# Patient Record
Sex: MALE | Race: BLACK OR AFRICAN AMERICAN | URBAN - METROPOLITAN AREA
[De-identification: names, ages, dates, MRNs, and addresses within clinical notes are randomized per-mention and may not be internally consistent; named-entity substitution may affect disease eponyms.]

---

## 2018-06-26 ENCOUNTER — OUTPATIENT (OUTPATIENT)
Dept: OUTPATIENT SERVICES | Facility: HOSPITAL | Age: 43
LOS: 1 days | Discharge: HOME | End: 2018-06-26

## 2018-06-26 DIAGNOSIS — E78.01 FAMILIAL HYPERCHOLESTEROLEMIA: ICD-10-CM

## 2018-06-26 DIAGNOSIS — E11.9 TYPE 2 DIABETES MELLITUS WITHOUT COMPLICATIONS: ICD-10-CM

## 2019-02-13 ENCOUNTER — OUTPATIENT (OUTPATIENT)
Dept: OUTPATIENT SERVICES | Facility: HOSPITAL | Age: 44
LOS: 1 days | Discharge: HOME | End: 2019-02-13

## 2019-02-13 DIAGNOSIS — E11.9 TYPE 2 DIABETES MELLITUS WITHOUT COMPLICATIONS: ICD-10-CM

## 2019-03-31 ENCOUNTER — INPATIENT (INPATIENT)
Facility: HOSPITAL | Age: 44
LOS: 2 days | Discharge: HOME | End: 2019-04-03
Attending: INTERNAL MEDICINE | Admitting: INTERNAL MEDICINE

## 2019-03-31 VITALS
OXYGEN SATURATION: 98 % | RESPIRATION RATE: 18 BRPM | TEMPERATURE: 97 F | DIASTOLIC BLOOD PRESSURE: 97 MMHG | HEART RATE: 68 BPM | SYSTOLIC BLOOD PRESSURE: 172 MMHG

## 2019-03-31 LAB
ALBUMIN SERPL ELPH-MCNC: 4 G/DL — SIGNIFICANT CHANGE UP (ref 3.5–5.2)
ALBUMIN SERPL ELPH-MCNC: 4.5 G/DL — SIGNIFICANT CHANGE UP (ref 3.5–5.2)
ALP SERPL-CCNC: 70 U/L — SIGNIFICANT CHANGE UP (ref 30–115)
ALP SERPL-CCNC: 82 U/L — SIGNIFICANT CHANGE UP (ref 30–115)
ALT FLD-CCNC: 28 U/L — SIGNIFICANT CHANGE UP (ref 0–41)
ALT FLD-CCNC: 31 U/L — SIGNIFICANT CHANGE UP (ref 0–41)
ANION GAP SERPL CALC-SCNC: 12 MMOL/L — SIGNIFICANT CHANGE UP (ref 7–14)
ANION GAP SERPL CALC-SCNC: 9 MMOL/L — SIGNIFICANT CHANGE UP (ref 7–14)
APTT BLD: 34.4 SEC — SIGNIFICANT CHANGE UP (ref 27–39.2)
AST SERPL-CCNC: 20 U/L — SIGNIFICANT CHANGE UP (ref 0–41)
AST SERPL-CCNC: 26 U/L — SIGNIFICANT CHANGE UP (ref 0–41)
BASOPHILS # BLD AUTO: 0.01 K/UL — SIGNIFICANT CHANGE UP (ref 0–0.2)
BASOPHILS # BLD AUTO: 0.02 K/UL — SIGNIFICANT CHANGE UP (ref 0–0.2)
BASOPHILS NFR BLD AUTO: 0.1 % — SIGNIFICANT CHANGE UP (ref 0–1)
BASOPHILS NFR BLD AUTO: 0.4 % — SIGNIFICANT CHANGE UP (ref 0–1)
BILIRUB SERPL-MCNC: 0.3 MG/DL — SIGNIFICANT CHANGE UP (ref 0.2–1.2)
BILIRUB SERPL-MCNC: 0.3 MG/DL — SIGNIFICANT CHANGE UP (ref 0.2–1.2)
BUN SERPL-MCNC: 13 MG/DL — SIGNIFICANT CHANGE UP (ref 10–20)
BUN SERPL-MCNC: 9 MG/DL — LOW (ref 10–20)
CALCIUM SERPL-MCNC: 9.2 MG/DL — SIGNIFICANT CHANGE UP (ref 8.5–10.1)
CALCIUM SERPL-MCNC: 9.5 MG/DL — SIGNIFICANT CHANGE UP (ref 8.5–10.1)
CHLORIDE SERPL-SCNC: 101 MMOL/L — SIGNIFICANT CHANGE UP (ref 98–110)
CHLORIDE SERPL-SCNC: 104 MMOL/L — SIGNIFICANT CHANGE UP (ref 98–110)
CO2 SERPL-SCNC: 24 MMOL/L — SIGNIFICANT CHANGE UP (ref 17–32)
CO2 SERPL-SCNC: 27 MMOL/L — SIGNIFICANT CHANGE UP (ref 17–32)
CREAT SERPL-MCNC: 0.9 MG/DL — SIGNIFICANT CHANGE UP (ref 0.7–1.5)
CREAT SERPL-MCNC: 1 MG/DL — SIGNIFICANT CHANGE UP (ref 0.7–1.5)
EOSINOPHIL # BLD AUTO: 0.11 K/UL — SIGNIFICANT CHANGE UP (ref 0–0.7)
EOSINOPHIL # BLD AUTO: 0.25 K/UL — SIGNIFICANT CHANGE UP (ref 0–0.7)
EOSINOPHIL NFR BLD AUTO: 2.1 % — SIGNIFICANT CHANGE UP (ref 0–8)
EOSINOPHIL NFR BLD AUTO: 3.5 % — SIGNIFICANT CHANGE UP (ref 0–8)
GLUCOSE BLDC GLUCOMTR-MCNC: 129 MG/DL — HIGH (ref 70–99)
GLUCOSE BLDC GLUCOMTR-MCNC: 82 MG/DL — SIGNIFICANT CHANGE UP (ref 70–99)
GLUCOSE SERPL-MCNC: 115 MG/DL — HIGH (ref 70–99)
GLUCOSE SERPL-MCNC: 220 MG/DL — HIGH (ref 70–99)
HCT VFR BLD CALC: 39.3 % — LOW (ref 42–52)
HCT VFR BLD CALC: 40.5 % — LOW (ref 42–52)
HGB BLD-MCNC: 12.9 G/DL — LOW (ref 14–18)
HGB BLD-MCNC: 13.3 G/DL — LOW (ref 14–18)
IMM GRANULOCYTES NFR BLD AUTO: 0.1 % — SIGNIFICANT CHANGE UP (ref 0.1–0.3)
IMM GRANULOCYTES NFR BLD AUTO: 0.2 % — SIGNIFICANT CHANGE UP (ref 0.1–0.3)
INR BLD: 1.01 RATIO — SIGNIFICANT CHANGE UP (ref 0.65–1.3)
LYMPHOCYTES # BLD AUTO: 2.39 K/UL — SIGNIFICANT CHANGE UP (ref 1.2–3.4)
LYMPHOCYTES # BLD AUTO: 3.46 K/UL — HIGH (ref 1.2–3.4)
LYMPHOCYTES # BLD AUTO: 45.2 % — SIGNIFICANT CHANGE UP (ref 20.5–51.1)
LYMPHOCYTES # BLD AUTO: 48.8 % — SIGNIFICANT CHANGE UP (ref 20.5–51.1)
MAGNESIUM SERPL-MCNC: 2.3 MG/DL — SIGNIFICANT CHANGE UP (ref 1.8–2.4)
MCHC RBC-ENTMCNC: 25.2 PG — LOW (ref 27–31)
MCHC RBC-ENTMCNC: 25.3 PG — LOW (ref 27–31)
MCHC RBC-ENTMCNC: 32.8 G/DL — SIGNIFICANT CHANGE UP (ref 32–37)
MCHC RBC-ENTMCNC: 32.8 G/DL — SIGNIFICANT CHANGE UP (ref 32–37)
MCV RBC AUTO: 76.8 FL — LOW (ref 80–94)
MCV RBC AUTO: 77.1 FL — LOW (ref 80–94)
MONOCYTES # BLD AUTO: 0.46 K/UL — SIGNIFICANT CHANGE UP (ref 0.1–0.6)
MONOCYTES # BLD AUTO: 0.52 K/UL — SIGNIFICANT CHANGE UP (ref 0.1–0.6)
MONOCYTES NFR BLD AUTO: 7.3 % — SIGNIFICANT CHANGE UP (ref 1.7–9.3)
MONOCYTES NFR BLD AUTO: 8.7 % — SIGNIFICANT CHANGE UP (ref 1.7–9.3)
NEUTROPHILS # BLD AUTO: 2.3 K/UL — SIGNIFICANT CHANGE UP (ref 1.4–6.5)
NEUTROPHILS # BLD AUTO: 2.84 K/UL — SIGNIFICANT CHANGE UP (ref 1.4–6.5)
NEUTROPHILS NFR BLD AUTO: 40.2 % — LOW (ref 42.2–75.2)
NEUTROPHILS NFR BLD AUTO: 43.4 % — SIGNIFICANT CHANGE UP (ref 42.2–75.2)
NRBC # BLD: 0 /100 WBCS — SIGNIFICANT CHANGE UP (ref 0–0)
NRBC # BLD: 0 /100 WBCS — SIGNIFICANT CHANGE UP (ref 0–0)
PLATELET # BLD AUTO: 191 K/UL — SIGNIFICANT CHANGE UP (ref 130–400)
PLATELET # BLD AUTO: 198 K/UL — SIGNIFICANT CHANGE UP (ref 130–400)
POTASSIUM SERPL-MCNC: 3.9 MMOL/L — SIGNIFICANT CHANGE UP (ref 3.5–5)
POTASSIUM SERPL-MCNC: 4.4 MMOL/L — SIGNIFICANT CHANGE UP (ref 3.5–5)
POTASSIUM SERPL-SCNC: 3.9 MMOL/L — SIGNIFICANT CHANGE UP (ref 3.5–5)
POTASSIUM SERPL-SCNC: 4.4 MMOL/L — SIGNIFICANT CHANGE UP (ref 3.5–5)
PROT SERPL-MCNC: 7.3 G/DL — SIGNIFICANT CHANGE UP (ref 6–8)
PROT SERPL-MCNC: 8 G/DL — SIGNIFICANT CHANGE UP (ref 6–8)
PROTHROM AB SERPL-ACNC: 11.6 SEC — SIGNIFICANT CHANGE UP (ref 9.95–12.87)
RBC # BLD: 5.12 M/UL — SIGNIFICANT CHANGE UP (ref 4.7–6.1)
RBC # BLD: 5.25 M/UL — SIGNIFICANT CHANGE UP (ref 4.7–6.1)
RBC # FLD: 13.4 % — SIGNIFICANT CHANGE UP (ref 11.5–14.5)
RBC # FLD: 13.5 % — SIGNIFICANT CHANGE UP (ref 11.5–14.5)
SODIUM SERPL-SCNC: 137 MMOL/L — SIGNIFICANT CHANGE UP (ref 135–146)
SODIUM SERPL-SCNC: 140 MMOL/L — SIGNIFICANT CHANGE UP (ref 135–146)
TROPONIN T SERPL-MCNC: <0.01 NG/ML — SIGNIFICANT CHANGE UP
TROPONIN T SERPL-MCNC: <0.01 NG/ML — SIGNIFICANT CHANGE UP
WBC # BLD: 5.29 K/UL — SIGNIFICANT CHANGE UP (ref 4.8–10.8)
WBC # BLD: 7.09 K/UL — SIGNIFICANT CHANGE UP (ref 4.8–10.8)
WBC # FLD AUTO: 5.29 K/UL — SIGNIFICANT CHANGE UP (ref 4.8–10.8)
WBC # FLD AUTO: 7.09 K/UL — SIGNIFICANT CHANGE UP (ref 4.8–10.8)

## 2019-03-31 RX ORDER — INSULIN GLARGINE 100 [IU]/ML
9 INJECTION, SOLUTION SUBCUTANEOUS AT BEDTIME
Qty: 0 | Refills: 0 | Status: DISCONTINUED | OUTPATIENT
Start: 2019-03-31 | End: 2019-04-03

## 2019-03-31 RX ORDER — ATORVASTATIN CALCIUM 80 MG/1
40 TABLET, FILM COATED ORAL AT BEDTIME
Qty: 0 | Refills: 0 | Status: DISCONTINUED | OUTPATIENT
Start: 2019-03-31 | End: 2019-04-01

## 2019-03-31 RX ORDER — SODIUM CHLORIDE 9 MG/ML
1000 INJECTION INTRAMUSCULAR; INTRAVENOUS; SUBCUTANEOUS
Qty: 0 | Refills: 0 | Status: DISCONTINUED | OUTPATIENT
Start: 2019-03-31 | End: 2019-03-31

## 2019-03-31 RX ORDER — HEPARIN SODIUM 5000 [USP'U]/ML
5000 INJECTION INTRAVENOUS; SUBCUTANEOUS EVERY 8 HOURS
Qty: 0 | Refills: 0 | Status: DISCONTINUED | OUTPATIENT
Start: 2019-03-31 | End: 2019-04-03

## 2019-03-31 RX ORDER — SODIUM CHLORIDE 9 MG/ML
1000 INJECTION, SOLUTION INTRAVENOUS
Qty: 0 | Refills: 0 | Status: DISCONTINUED | OUTPATIENT
Start: 2019-03-31 | End: 2019-04-03

## 2019-03-31 RX ORDER — ASPIRIN/CALCIUM CARB/MAGNESIUM 324 MG
325 TABLET ORAL ONCE
Qty: 0 | Refills: 0 | Status: COMPLETED | OUTPATIENT
Start: 2019-03-31 | End: 2019-03-31

## 2019-03-31 RX ORDER — SODIUM CHLORIDE 9 MG/ML
1000 INJECTION INTRAMUSCULAR; INTRAVENOUS; SUBCUTANEOUS
Qty: 0 | Refills: 0 | Status: DISCONTINUED | OUTPATIENT
Start: 2019-03-31 | End: 2019-04-01

## 2019-03-31 RX ORDER — AMLODIPINE BESYLATE 2.5 MG/1
10 TABLET ORAL DAILY
Qty: 0 | Refills: 0 | Status: DISCONTINUED | OUTPATIENT
Start: 2019-03-31 | End: 2019-04-01

## 2019-03-31 RX ORDER — ATORVASTATIN CALCIUM 80 MG/1
80 TABLET, FILM COATED ORAL ONCE
Qty: 0 | Refills: 0 | Status: COMPLETED | OUTPATIENT
Start: 2019-03-31 | End: 2019-03-31

## 2019-03-31 RX ORDER — DEXTROSE 50 % IN WATER 50 %
15 SYRINGE (ML) INTRAVENOUS ONCE
Qty: 0 | Refills: 0 | Status: DISCONTINUED | OUTPATIENT
Start: 2019-03-31 | End: 2019-04-03

## 2019-03-31 RX ORDER — DEXTROSE 50 % IN WATER 50 %
12.5 SYRINGE (ML) INTRAVENOUS ONCE
Qty: 0 | Refills: 0 | Status: DISCONTINUED | OUTPATIENT
Start: 2019-03-31 | End: 2019-04-03

## 2019-03-31 RX ORDER — GLUCAGON INJECTION, SOLUTION 0.5 MG/.1ML
1 INJECTION, SOLUTION SUBCUTANEOUS ONCE
Qty: 0 | Refills: 0 | Status: DISCONTINUED | OUTPATIENT
Start: 2019-03-31 | End: 2019-04-03

## 2019-03-31 RX ORDER — LISINOPRIL 2.5 MG/1
40 TABLET ORAL DAILY
Qty: 0 | Refills: 0 | Status: DISCONTINUED | OUTPATIENT
Start: 2019-03-31 | End: 2019-04-01

## 2019-03-31 RX ORDER — ASPIRIN/CALCIUM CARB/MAGNESIUM 324 MG
300 TABLET ORAL DAILY
Qty: 0 | Refills: 0 | Status: DISCONTINUED | OUTPATIENT
Start: 2019-03-31 | End: 2019-04-01

## 2019-03-31 RX ORDER — INSULIN LISPRO 100/ML
3 VIAL (ML) SUBCUTANEOUS
Qty: 0 | Refills: 0 | Status: DISCONTINUED | OUTPATIENT
Start: 2019-03-31 | End: 2019-04-03

## 2019-03-31 RX ORDER — INSULIN LISPRO 100/ML
VIAL (ML) SUBCUTANEOUS
Qty: 0 | Refills: 0 | Status: DISCONTINUED | OUTPATIENT
Start: 2019-03-31 | End: 2019-04-03

## 2019-03-31 RX ORDER — METOPROLOL TARTRATE 50 MG
50 TABLET ORAL
Qty: 0 | Refills: 0 | Status: DISCONTINUED | OUTPATIENT
Start: 2019-03-31 | End: 2019-04-03

## 2019-03-31 RX ADMIN — Medication 50 MILLIGRAM(S): at 18:37

## 2019-03-31 RX ADMIN — ATORVASTATIN CALCIUM 40 MILLIGRAM(S): 80 TABLET, FILM COATED ORAL at 21:16

## 2019-03-31 RX ADMIN — AMLODIPINE BESYLATE 10 MILLIGRAM(S): 2.5 TABLET ORAL at 21:16

## 2019-03-31 RX ADMIN — SODIUM CHLORIDE 75 MILLILITER(S): 9 INJECTION INTRAMUSCULAR; INTRAVENOUS; SUBCUTANEOUS at 13:44

## 2019-03-31 RX ADMIN — LISINOPRIL 40 MILLIGRAM(S): 2.5 TABLET ORAL at 21:16

## 2019-03-31 RX ADMIN — ATORVASTATIN CALCIUM 80 MILLIGRAM(S): 80 TABLET, FILM COATED ORAL at 11:00

## 2019-03-31 RX ADMIN — HEPARIN SODIUM 5000 UNIT(S): 5000 INJECTION INTRAVENOUS; SUBCUTANEOUS at 21:16

## 2019-03-31 RX ADMIN — Medication 325 MILLIGRAM(S): at 08:53

## 2019-03-31 RX ADMIN — INSULIN GLARGINE 9 UNIT(S): 100 INJECTION, SOLUTION SUBCUTANEOUS at 21:17

## 2019-03-31 RX ADMIN — SODIUM CHLORIDE 75 MILLILITER(S): 9 INJECTION INTRAMUSCULAR; INTRAVENOUS; SUBCUTANEOUS at 18:38

## 2019-03-31 NOTE — ED ADULT TRIAGE NOTE - CHIEF COMPLAINT QUOTE
Tingling to right hand since waking up at 5am, right sided weakness in leg, elevated blood pressure this morning. FS in triage 204.

## 2019-03-31 NOTE — ED PROVIDER NOTE - ATTENDING CONTRIBUTION TO CARE
43y m h/o htn, dm p/w R sided numbness since 5am. Woke up at 4am in usual soh, dvlpd sudden onset rue/rle numbness at 5am. Accomp by subj rle weakness. Denies ha, blurry vision, dizziness, cp/sob, nv, abd pain. Cardio [place] - no PMD. PE: nad, ncat, perrl/eomi, neck supple no bruits, rrr nl s1s2 no mrg, ctab no wrr, abd soft ntnd no palpable masses no rgr, no cvat, ext no cce dpi, neuro- aaox3, cn 2-12 intact bl no nystagmus or facial droop, 5/5 strength x 4 no drift, gross sensation intact, +rue dysmetria.

## 2019-03-31 NOTE — ED ADULT NURSE NOTE - NSFALLRSKHRMRISKTYPE_ED_ALL_ED
Date: 3/5/2019    Patient Name: Cornelius Bowman          To Whom it may concern: This letter has been written at the patient's request. The above patient was seen at the [facility] for treatment of a medical condition.     This patient paulie other

## 2019-03-31 NOTE — ED PROVIDER NOTE - OBJECTIVE STATEMENT
42yo M hx HTN DM otherwise healthy pw RUE and RLE numbness since 5am- woke up at 4am feeling normal, then felt RUE/RLE numbness developing- pt feels mildly improved now but still co sx- no headache vision changes f/c/n/v/d, chest pain, shortness of breath, abdominal pain, other numbness/focal weakness, back pain, dysuria/hematuria, hx CVA/TIA

## 2019-03-31 NOTE — H&P ADULT - NSHPPHYSICALEXAM_GEN_ALL_CORE
GEN: No acute distress  LUNGS: breath sounds bilaterally   HEART: S1/S2 present. RRR.   ABD: Soft, non-tender, non-distended.  EXT: No edema, cyanosis, moves all extremities   NEURO: AAOX3, non focal. NIHSS is 0

## 2019-03-31 NOTE — H&P ADULT - ATTENDING COMMENTS
Patient is seen and examined independently at bedside. I agree with the resident's note, history and plan as above. Pt states that he is compliant with medications and did not miss his blood pressure medications.    PHYSICAL EXAM:  CONSTITUTIONAL: NAD, well-groomed, well-developed.  HEAD:  Atraumatic, Normocephalic.  EYES: EOMI, PERRLA, conjunctiva and sclera clear.  ENMT: Moist mucous membranes, No lesions.  NERVOUS SYSTEM:  Alert & Oriented X3, Good concentration; No limb weakness or numbness. Right palm paresthesia. Finger to nose test abnormal on right side. Dysmetria present over right hand and right pelvic and leg.  RESPIRATORY: Clear to ascultation bilaterally; No rales, rhonchi, wheezing, or rubs.  CARDIOVASCULAR: Regular rate and rhythm; No murmurs, rubs, or gallops.  GASTROINTESTINAL: Soft, Nontender, Nondistended;  MUSCULOSKELETAL: No joint swelling or tenderness. No neck or back pain.  EXTREMITIES: No clubbing, cyanosis, or edema.  LYMPH: No cervical lymphadenopathy noted.  SKIN: No rashes or lesions.    # Dysmetria of right upper and lower extremity  - likely cerebellar origin, cognitive in tact  - rule out CVA vs. mass vs. demyelinating/motor neuron disease  - c/w aspirin and Lipitor for now  - check [id number] and lipid profile  - follow up neurology  - order for 2D Echo with bubble study and MRI Head NC  - c/w IV hydration as per neurology    # DM II - monitor fingerstick glucose, c/w insulin regimen, hold po diabetic medications  # HTN, stable - c/w home medications    All labs, radiologic studies, vitals, orders and medications list reviewed. Patient is seen and examined independently at bedside. I agree with the resident's note, history and plan as above. Pt states that he is compliant with medications and did not miss his blood pressure medications.    PHYSICAL EXAM:  CONSTITUTIONAL: NAD, well-groomed, well-developed.  HEAD:  Atraumatic, Normocephalic.  EYES: EOMI, PERRLA, conjunctiva and sclera clear.  ENMT: Moist mucous membranes, No lesions.  NERVOUS SYSTEM:  Alert & Oriented X3, Good concentration; No limb weakness or numbness. Right palm paresthesia. Finger to nose test abnormal on right side. Dysmetria present over right hand and right pelvic and leg.  RESPIRATORY: Clear to ascultation bilaterally; No rales, rhonchi, wheezing, or rubs.  CARDIOVASCULAR: Regular rate and rhythm; No murmurs, rubs, or gallops.  GASTROINTESTINAL: Soft, Nontender, Nondistended;  MUSCULOSKELETAL: No joint swelling or tenderness. No neck or back pain.  EXTREMITIES: No clubbing, cyanosis, or edema.  LYMPH: No cervical lymphadenopathy noted.  SKIN: No rashes or lesions.    # Dysmetria of right upper and lower extremity  - likely cerebellar origin, cognitive in tact  - rule out CVA vs. mass vs. demyelinating/motor neuron disease  - c/w aspirin and Lipitor for now  - check [id number] and lipid profile  - follow up neurology  - order for 2D Echo with bubble study and MRI Head NC  - c/w IV hydration as per neurology    # Mitral regurgitation with torn chord associated with left atrium enlargement  - Cardiology evaluation, may need ZAY to assess for thrombus    # DM II - monitor fingerstick glucose, c/w insulin regimen, hold po diabetic medications  # HTN, stable - c/w home medications    All labs, radiologic studies, vitals, orders and medications list reviewed. Patient is seen and examined independently at bedside. I agree with the resident's note, history and plan as above. Pt states that he is compliant with medications and did not miss his blood pressure medications.    PHYSICAL EXAM:  CONSTITUTIONAL: NAD, well-groomed, well-developed.  HEAD:  Atraumatic, Normocephalic.  EYES: EOMI, PERRLA, conjunctiva and sclera clear. No nystagmus.   ENMT: Moist mucous membranes, No lesions.  NERVOUS SYSTEM:  Alert & Oriented X3, Good concentration; No limb weakness or numbness. Right palm paresthesia. Finger to nose test abnormal on right side. Dysmetria present over right hand and right pelvic and leg.  RESPIRATORY: Clear to ascultation bilaterally; No rales, rhonchi, wheezing, or rubs.  CARDIOVASCULAR: Regular rate and rhythm; No murmurs, rubs, or gallops.  GASTROINTESTINAL: Soft, Nontender, Nondistended;  MUSCULOSKELETAL: No joint swelling or tenderness. No neck or back pain.  EXTREMITIES: No clubbing, cyanosis, or edema.  LYMPH: No cervical lymphadenopathy noted.  SKIN: No rashes or lesions.    # Dysmetria of right upper and lower extremity  - likely cerebellar origin, cognitive intact  - rule out CVA vs. mass vs. demyelinating/motor neuron disease  - c/w aspirin and Lipitor for now  - check [id number] and lipid profile  - follow up neurology  - order for 2D Echo with bubble study and MRI Head NC  - c/w IV hydration as per neurology    # Mitral regurgitation with torn chord associated with left atrium enlargement  - Cardiology evaluation, may need ZAY to assess for thrombus rule out underlying arrhythmia, waiting neuro input pending MRI result    # DM II - monitor fingerstick glucose, c/w insulin regimen, hold po diabetic medications  # HTN, stable - c/w home medications    All labs, radiologic studies, vitals, orders and medications list reviewed.

## 2019-03-31 NOTE — ED PROVIDER NOTE - PROGRESS NOTE DETAILS
pt feeling improved- neuro Katerina at bedside, NIH0, per her and discussions w Dr. Gar, recs TIA obs pt still feeling improved, NIH 0, will obs sp eval by Dr. Gar- +R sided dysmetria, recs CTA H/N to r/o dissection, admit to stroke unit sp eval by Dr. Gar- +R sided dysmetria, recs CTA H/N to r/o dissection, asa/statin, admit to stroke unit

## 2019-03-31 NOTE — ED PROVIDER NOTE - CLINICAL SUMMARY MEDICAL DECISION MAKING FREE TEXT BOX
stroke-like sx 5am, abnormal neuro exam - stroke code activated by triage on arrival - non-con CTH nl, pt eval by Neuro team incl Dr. Noah Coburn who rec CTA head/neck to r/o dissection, asa/statin & stroke unit admission

## 2019-03-31 NOTE — CONSULT NOTE ADULT - ASSESSMENT
40 yo male with pmh as stated above presents with acute onset of right sided paresthesia and dysmetria. NIHSS is 1. Patient is outside of tPA window. CTH is negative.    Plan:  CTA head and neck  [id number], Statin 80  Keep systolic 140-180  Lipid profile, HA1C  Admit to stroke unit  Frequent neurochecks  MRI brain NC  Echo with bubble study  Speech/swallow  PT/OT  IVF    Neuroattending note will follow 42 yo male with pmh as stated above presents with acute onset of right sided paresthesia and dysmetria. NIHSS is 1. Patient is outside of tPA window. CTH is negative.    Plan:  CTA head and neck  [id number] x 1 then 81mg QD, Statin 80  Keep systolic 140-180  Lipid profile, HA1C  Hypercoagulable workup  Admit to stroke unit  Frequent neurochecks  MRI brain NC  Echo with bubble study  Speech/swallow  PT/OT  IVF NS at 75cc/hr

## 2019-03-31 NOTE — H&P ADULT - NSICDXFAMILYHX_GEN_ALL_CORE_FT
FAMILY HISTORY:  No pertinent family history in first degree relatives FAMILY HISTORY:  Family history of kidney stones, Father

## 2019-03-31 NOTE — ED ADULT NURSE NOTE - NSIMPLEMENTINTERV_GEN_ALL_ED
Implemented All Fall with Harm Risk Interventions:  [place] to call system. Call bell, personal items and telephone within reach. Instruct patient to call for assistance. Room bathroom lighting operational. Non-slip footwear when patient is off stretcher. Physically safe environment: no spills, clutter or unnecessary equipment. Stretcher in lowest position, wheels locked, appropriate side rails in place. Provide visual cue, wrist band, yellow gown, etc. Monitor gait and stability. Monitor for mental status changes and reorient to person, place, and time. Review medications for side effects contributing to fall risk. Reinforce activity limits and safety measures with patient and family. Provide visual clues: red socks.

## 2019-03-31 NOTE — CONSULT NOTE ADULT - ATTENDING COMMENTS
Patient seen and examined with PA shortly after stroke code.   Patient woke up this AM at 4AM then fell asleep on right side on the hardwood floor.  Woke up 5-5:30 with right side numb.  Tried exercising and wasn't moving right leg well.  Initial NIHSS 0 in ED  However reassessment by me showed NIHSS of 2 for dysmetria in right arm and leg    Requested a STAT CTA H+N to look for possible dissection     Plan as above

## 2019-03-31 NOTE — ED PROVIDER NOTE - PHYSICAL EXAMINATION
Con: Well appearing NAD non toxic.   HEENT: NCAT PERRLA EOMI conjunctiva normal. No nasal discharge. MMM.   Neck: Supple, non tender, full ROM.   CV: RRR no MRG +S1S2.   Pulm: CTA b/l.   Abd: s NT ND +BS.   Ext: WWP x4, moving all extremities, no edema.   Psy: Cooperative, appropriate.   Skin: Warm, dry, no rash  Neuro: CN2-12 grossly intact no sensory or motor deficits throughout, no drift, rapid alternating wnl, no ataxia, neg romberg.

## 2019-03-31 NOTE — H&P ADULT - HISTORY OF PRESENT ILLNESS
41 year old male with pertinent medical history of Hypertension and Diabetes Mellitus presents to the ED with acute onset of weakness. He was in normal state of health watching TV when around 430 am in the morning he had symptoms of right hand weakness. It was associated with numbness and tingling in the same hand. He decided to get up and found his right leg felt slightly weak and unstable. He started exercising and did some squats to find his right side more unsteady than the right. He took his Blood pressure and found out that it was high (above 170/125) and his fingerstick was high in 300s. He denies any headache, dizziness, Nausea or Vomiting and any visual changes.  In ED stroke code was called, NIHSS is 1, patient has right sided dysmetria.

## 2019-03-31 NOTE — H&P ADULT - NSHPLABSRESULTS_GEN_ALL_CORE
VITALS:   T(F): 98  HR: 58  BP: 143/87  RR: 16  SpO2: 100%    LABS:                        13.3   5.29  )-----------( 198      ( 31 Mar 2019 07:26 )             40.5     03-31    137  |  101  |  9<L>  ----------------------------<  220<H>  4.4   |  27  |  0.9    Ca    9.5      31 Mar 2019 07:26  Mg     2.3     03-31    TPro  8.0  /  Alb  4.5  /  TBili  0.3  /  DBili  x   /  AST  26  /  ALT  31  /  AlkPhos  82  03-31    PT/INR - ( 31 Mar 2019 07:26 )   PT: 11.60 sec;   INR: 1.01 ratio         PTT - ( 31 Mar 2019 07:26 )  PTT:34.4 sec      Troponin T, Serum: <0.01 ng/mL (03-31-19 @ 07:26)      CARDIAC MARKERS ( 31 Mar 2019 07:26 )  x     / <0.01 ng/mL / x     / x     / x          RADIOLOGY:    CT Angio Head and Neck w/ IV Cont (03.31.19 @ 09:50) >  Unremarkable CT angiogram of the head and neck. Incidentally noted retropharyngeal course of the right internal carotid   artery, normal variant.     CT Head  No CT evidence for acute intracranial pathology.   Periventricular white matter chronic microvascular changes.

## 2019-03-31 NOTE — H&P ADULT - ASSESSMENT
41 year old male being evaluated for following medical problems    Rule out Transient Ischemic Stroke  -Continue telemetry monitoring  -Currently NIHSS is 0  -Negative CTA head and neck and CT head shows chronic microvascular changes  -Follow up on MRI of the brain without contrast  -Keep SBP between 120-160  -Keep HOB at 30  -Keep electrolytes in check   -Neuro-checks every 4 hours  -On aspirin and atorvastatin for now  -Follow up Lipid profile and HbA1c  -Speech and swallow evaluation  -PT and OT requested  -Physiatry requested  -On IV fluids  -Follow up Neurology recommendation    Hypertension  -Continue lisinopril and amlodipine  -Will have on Metoprolol (no Bystolic in the hospital)    Diabetes Mellitus  -Will have on Insulin in the hospital    Full Code  Comes from home  On DVT prophylaxis with heparin 41 year old male being evaluated for following medical problems    Rule out Transient Ischemic Stroke  -Continue telemetry monitoring  -Currently NIHSS is 0  -Negative CTA head and neck and CT head shows chronic microvascular changes  -Follow up on MRI of the brain without contrast  -Keep SBP between 120-160  -Keep HOB at 30  -Keep electrolytes in check   -Neuro-checks every 4 hours  -On aspirin and atorvastatin for now  -Follow up Lipid profile and HbA1c  -Speech and swallow evaluation not needed, passed dysphagia screen with no issues   -PT and OT requested  -Physiatry requested  -On IV fluids  -Follow up Neurology recommendation    Hypertension  -Continue lisinopril and amlodipine  -Will have on Metoprolol (no Bystolic in the hospital)    Diabetes Mellitus  -Will have on Insulin in the hospital    Full Code  Comes from home  On DVT prophylaxis with heparin

## 2019-04-01 LAB
ALBUMIN SERPL ELPH-MCNC: 4 G/DL — SIGNIFICANT CHANGE UP (ref 3.5–5.2)
ALP SERPL-CCNC: 70 U/L — SIGNIFICANT CHANGE UP (ref 30–115)
ALT FLD-CCNC: 29 U/L — SIGNIFICANT CHANGE UP (ref 0–41)
ANION GAP SERPL CALC-SCNC: 10 MMOL/L — SIGNIFICANT CHANGE UP (ref 7–14)
APTT BLD: 32.9 SEC — SIGNIFICANT CHANGE UP (ref 27–39.2)
AST SERPL-CCNC: 23 U/L — SIGNIFICANT CHANGE UP (ref 0–41)
BILIRUB DIRECT SERPL-MCNC: <0.2 MG/DL — SIGNIFICANT CHANGE UP (ref 0–0.2)
BILIRUB INDIRECT FLD-MCNC: >0.2 MG/DL — SIGNIFICANT CHANGE UP (ref 0.2–1.2)
BILIRUB SERPL-MCNC: 0.4 MG/DL — SIGNIFICANT CHANGE UP (ref 0.2–1.2)
BUN SERPL-MCNC: 12 MG/DL — SIGNIFICANT CHANGE UP (ref 10–20)
CALCIUM SERPL-MCNC: 9.3 MG/DL — SIGNIFICANT CHANGE UP (ref 8.5–10.1)
CHLORIDE SERPL-SCNC: 103 MMOL/L — SIGNIFICANT CHANGE UP (ref 98–110)
CHOLEST SERPL-MCNC: 144 MG/DL — SIGNIFICANT CHANGE UP (ref 100–200)
CO2 SERPL-SCNC: 25 MMOL/L — SIGNIFICANT CHANGE UP (ref 17–32)
CREAT SERPL-MCNC: 0.9 MG/DL — SIGNIFICANT CHANGE UP (ref 0.7–1.5)
ESTIMATED AVERAGE GLUCOSE: 217 MG/DL — HIGH (ref 68–114)
GLUCOSE BLDC GLUCOMTR-MCNC: 104 MG/DL — HIGH (ref 70–99)
GLUCOSE BLDC GLUCOMTR-MCNC: 114 MG/DL — HIGH (ref 70–99)
GLUCOSE BLDC GLUCOMTR-MCNC: 125 MG/DL — HIGH (ref 70–99)
GLUCOSE BLDC GLUCOMTR-MCNC: 126 MG/DL — HIGH (ref 70–99)
GLUCOSE SERPL-MCNC: 102 MG/DL — HIGH (ref 70–99)
HBA1C BLD-MCNC: 9.2 % — HIGH (ref 4–5.6)
HDLC SERPL-MCNC: 39 MG/DL — LOW
INR BLD: 1.07 RATIO — SIGNIFICANT CHANGE UP (ref 0.65–1.3)
LIPID PNL WITH DIRECT LDL SERPL: 100 MG/DL — SIGNIFICANT CHANGE UP (ref 4–129)
POTASSIUM SERPL-MCNC: 4.3 MMOL/L — SIGNIFICANT CHANGE UP (ref 3.5–5)
POTASSIUM SERPL-SCNC: 4.3 MMOL/L — SIGNIFICANT CHANGE UP (ref 3.5–5)
PROT SERPL-MCNC: 7.5 G/DL — SIGNIFICANT CHANGE UP (ref 6–8)
PROTHROM AB SERPL-ACNC: 12.3 SEC — SIGNIFICANT CHANGE UP (ref 9.95–12.87)
SODIUM SERPL-SCNC: 138 MMOL/L — SIGNIFICANT CHANGE UP (ref 135–146)
TOTAL CHOLESTEROL/HDL RATIO MEASUREMENT: 3.7 RATIO — LOW (ref 4–5.5)
TRIGL SERPL-MCNC: 113 MG/DL — SIGNIFICANT CHANGE UP (ref 10–149)

## 2019-04-01 RX ORDER — ASPIRIN/CALCIUM CARB/MAGNESIUM 324 MG
81 TABLET ORAL DAILY
Qty: 0 | Refills: 0 | Status: DISCONTINUED | OUTPATIENT
Start: 2019-04-01 | End: 2019-04-03

## 2019-04-01 RX ORDER — ATORVASTATIN CALCIUM 80 MG/1
80 TABLET, FILM COATED ORAL AT BEDTIME
Qty: 0 | Refills: 0 | Status: DISCONTINUED | OUTPATIENT
Start: 2019-04-01 | End: 2019-04-03

## 2019-04-01 RX ADMIN — Medication 50 MILLIGRAM(S): at 05:28

## 2019-04-01 RX ADMIN — HEPARIN SODIUM 5000 UNIT(S): 5000 INJECTION INTRAVENOUS; SUBCUTANEOUS at 21:09

## 2019-04-01 RX ADMIN — LISINOPRIL 40 MILLIGRAM(S): 2.5 TABLET ORAL at 05:28

## 2019-04-01 RX ADMIN — Medication 3 UNIT(S): at 11:53

## 2019-04-01 RX ADMIN — Medication 50 MILLIGRAM(S): at 17:06

## 2019-04-01 RX ADMIN — HEPARIN SODIUM 5000 UNIT(S): 5000 INJECTION INTRAVENOUS; SUBCUTANEOUS at 05:28

## 2019-04-01 RX ADMIN — Medication 3 UNIT(S): at 08:11

## 2019-04-01 RX ADMIN — INSULIN GLARGINE 9 UNIT(S): 100 INJECTION, SOLUTION SUBCUTANEOUS at 21:40

## 2019-04-01 RX ADMIN — ATORVASTATIN CALCIUM 80 MILLIGRAM(S): 80 TABLET, FILM COATED ORAL at 21:09

## 2019-04-01 RX ADMIN — AMLODIPINE BESYLATE 10 MILLIGRAM(S): 2.5 TABLET ORAL at 05:28

## 2019-04-01 RX ADMIN — HEPARIN SODIUM 5000 UNIT(S): 5000 INJECTION INTRAVENOUS; SUBCUTANEOUS at 13:39

## 2019-04-01 RX ADMIN — Medication 3 UNIT(S): at 17:06

## 2019-04-01 RX ADMIN — Medication 81 MILLIGRAM(S): at 11:38

## 2019-04-01 NOTE — PHYSICAL THERAPY INITIAL EVALUATION ADULT - STEPPING STRATEGY ASSESSMENT, REHAB EVAL
in stride position, LT heel to RT toes, patient with + LOB to RT, needs to take steps to stop from falling

## 2019-04-01 NOTE — CONSULT NOTE ADULT - ASSESSMENT
IMPRESSION: Rehab of CVA    PRECAUTIONS: [    ] Cardiac  [    ] Respiratory  [    ] Seizures [    ] Contact Isolation  [    ] Droplet Isolation  [    ] Other    Weight Bearing Status:     RECOMMENDATION:    Out of Bed to Chair     DVT/Decubiti Prophylaxis    REHAB PLAN:     [    x ] Bedside P/T 3-5 times a week   [  x   ] Bedside O/T  2-3 times a week   [     ] No Rehab Therapy Indicated   [     ]  Speech Therapy   Conditioning/ROM                                 ADL  Bed Mobility                                            Conditioning/ROM  Transfers                                                  Bed Mobility  Sitting /Standing Balance                      Transfers                                        Gait Training                                            Sitting/Standing Balance  Stair Training [ x  ]Applicable                 Home equipment Eval                                                                     Splinting  [   ] Only      GOALS:   ADL   [    x]   Independent         Transfers  [ xx   ] Independent            Ambulation  [  x   ] Independent     [     ] With device                            [    ]  CG                                               [    ]  CG                                                    [     ] CG                            [    ] Min A                                          [    ] Min A                                                [     ] Min  A          DISCHARGE PLAN:   [     ]  Good candidate for Intensive Rehabilitation/Hospital based                                             Will tolerate 3hrs Intensive Rehab Daily                                       [      ]  Short Term Rehab in Skilled Nursing Facility                                       [   x   ]  Home with Outpatient or VN services- ? OPD PT/OT                                          [      ]  Possible Candidate for Intensive Hospital based Rehab

## 2019-04-01 NOTE — CONSULT NOTE ADULT - SUBJECTIVE AND OBJECTIVE BOX
Patient is a 43y old  Male who presents with a chief complaint of weakness (01 Apr 2019 03:39)    HPI:  41 year old male with pertinent medical history of Hypertension and Diabetes Mellitus presents to the ED with acute onset of weakness. He was in normal state of health watching TV when around 430 am in the morning he had symptoms of right hand weakness. It was associated with numbness and tingling in the same hand. He decided to get up and found his right leg felt slightly weak and unstable. He started exercising and did some squats to find his right side more unsteady than the right. He took his Blood pressure and found out that it was high (above 170/125) and his fingerstick was high in 300s. He denies any headache, dizziness, Nausea or Vomiting and any visual changes.  In ED stroke code was called, NIHSS is 1, patient has right sided dysmetria. (31 Mar 2019 11:48)      PAST MEDICAL & SURGICAL HISTORY:  Diabetes mellitus  Hypertension  No significant past surgical history      Hospital Course: seen by neuro-MRI brain prelim left corona radiata infarct- BREWER in progress      TODAY'S SUBJECTIVE & REVIEW OF SYMPTOMS:     Constitutional WNL   Cardio WNL   Resp WNL   GI WNL  Heme WNL  Endo WNL  Skin WNL  MSK WNL  Neuro WNL  Cognitive WNL  Psych WNL      MEDICATIONS  (STANDING):  aspirin  chewable 81 milliGRAM(s) Oral daily  atorvastatin 80 milliGRAM(s) Oral at bedtime  dextrose 5%. 1000 milliLiter(s) (50 mL/Hr) IV Continuous <Continuous>  dextrose 50% Injectable 12.5 Gram(s) IV Push once  heparin  Injectable 5000 Unit(s) SubCutaneous every 8 hours  insulin glargine Injectable (LANTUS) 9 Unit(s) SubCutaneous at bedtime  insulin lispro (HumaLOG) corrective regimen sliding scale   SubCutaneous three times a day before meals  insulin lispro Injectable (HumaLOG) 3 Unit(s) SubCutaneous three times a day before meals  lisinopril 40 milliGRAM(s) Oral daily  metoprolol tartrate 50 milliGRAM(s) Oral two times a day    MEDICATIONS  (PRN):  dextrose 40% Gel 15 Gram(s) Oral once PRN Blood Glucose LESS THAN 70 milliGRAM(s)/deciliter  glucagon  Injectable 1 milliGRAM(s) IntraMuscular once PRN Glucose LESS THAN 70 milligrams/deciliter      FAMILY HISTORY:  Family history of kidney stones: Father      Allergies    No Known Allergies    Intolerances        SOCIAL HISTORY:    [    ] Etoh  [    ] Smoking  [    ] Substance abuse     Home Environment:  [    ] Home Alone  [ x   ] Lives with Family  [    ] Home Health Aid    Dwelling:  [ x   ] Apartment  [    ] Private House  [    ] Adult Home  [    ] Skilled Nursing Facility      [    ] Short Term  [    ] Long Term  [ x   ] Stairs                           [    ] Elevator     FUNCTIONAL STATUS PTA: (Check all that apply)  Ambulation: [  x   ]Independent    [    ] Dependent     [    ] Non-Ambulatory  Assistive Device: [    ] SA Cane  [    ]  Q Cane  [    ] Walker  [    ]  Wheelchair  ADL : [ x   ] Independent  [    ]  Dependent       Vital Signs Last 24 Hrs  T(C): 35.7 (01 Apr 2019 10:47), Max: 36.7 (31 Mar 2019 18:34)  T(F): 96.3 (01 Apr 2019 10:47), Max: 98.1 (31 Mar 2019 18:34)  HR: 83 (01 Apr 2019 10:47) (54 - 83)  BP: 131/84 (01 Apr 2019 10:47) (126/58 - 164/100)  BP(mean): --  RR: 18 (01 Apr 2019 10:47) (17 - 18)  SpO2: 99% (01 Apr 2019 02:29) (96% - 99%)      PHYSICAL EXAM: Alert & Oriented X3  GENERAL: NAD, well-groomed, well-developed  HEAD:  Atraumatic, Normocephalic  EYES: EOMI, PERRLA, conjunctiva and sclera clear  NECK: Supple  CHEST/LUNG: Clear bilaterally  HEART: Regular rate and rhythm  ABDOMEN: Soft, Nontender, Nondistended; Bowel sounds present  EXTREMITIES:  no calf tenderness,no edema BLES    NERVOUS SYSTEM:  Cranial Nerves 2-12 intact [  x  ] Abnormal  [    ]  ROM: WFL all extremities [ x   ]  Abnormal [     ]  Motor Strength: WFL all extremities  [ x   ]  Abnormal [    ]  Sensation: intact to light touch [  x  ] Abnormal [    ]CO Paresthesias R hand    FUNCTIONAL STATUS:  Bed Mobility: [   ]  Independent [x    ]  Supervision [    ]  Needs Assistance [  ]  N/A  Transfers: [    ]  Independent [ x   ]  Supervision [    ]  Needs Assistance [    ]  N/A    Ambulation:  [    ]  Independent [x    ]  Supervision [    ]  Needs Assistance [    ]  N/A   ADL:  [    ]   Independent [    ] Requires Assistance [x    ] N/A   ABLE TO TANDEM    LABS:                        12.9   7.09  )-----------( 191      ( 31 Mar 2019 20:44 )             39.3     04-01    138  |  103  |  12  ----------------------------<  102<H>  4.3   |  25  |  0.9    Ca    9.3      01 Apr 2019 04:44  Mg     2.3     03-31    TPro  7.5  /  Alb  4.0  /  TBili  0.4  /  DBili  <0.2  /  AST  23  /  ALT  29  /  AlkPhos  70  04-01    PT/INR - ( 01 Apr 2019 04:44 )   PT: 12.30 sec;   INR: 1.07 ratio         PTT - ( 01 Apr 2019 04:44 )  PTT:32.9 sec      RADIOLOGY & ADDITIONAL STUDIES:

## 2019-04-01 NOTE — PHYSICAL THERAPY INITIAL EVALUATION ADULT - GENERAL OBSERVATIONS, REHAB EVAL
[id number] am. patient rec'd/left sitting at EOB, nad. patient reports numbness and tingling on the ventral surface of the RT hand; he also reports unusual heaviness and decreased control of the RT LE (from hip to foot.)

## 2019-04-01 NOTE — PROGRESS NOTE ADULT - ASSESSMENT
41 year old male being evaluated for following medical problems    Ischemic Stroke; possibly Transient  -Negative CTA head and neck and CT head shows chronic microvascular changes  -Echo w/ EF 66%, moderate-severe left atrial enlargement, GIDD, torn tertiary chord to anterior mitral leaflet, pulmonic valve regurgitation, mild TR  -Likely ischemic lesion on MR Head seen best on 3-18; f/u on MRI of the brain without contrast (performed, not read)  -c/w telemetry monitoring  -Currently NIHSS is 0  -Keep SBP between 140-180 as per neuro  -Keep HOB at 30  -Neuro-checks every 4 hours  -c/w aspirin and atorvastatin for now  -Lipid profile wnl  -Follow up HbA1c  -Speech and swallow evaluation not needed, passed dysphagia screen with no issues   -f/u PT/Rehab  -Neurology on board    Hypertension  -c/w lisinopril  -holding amlodipine to allow for elevated BP  -c/w metoprolol (no Bystolic in the hospital)    Diabetes Mellitus  -On lispro 3, lantus 9, LSS    Full Code  Comes from home  On DVT prophylaxis: heparin subq

## 2019-04-02 LAB
ANION GAP SERPL CALC-SCNC: 13 MMOL/L — SIGNIFICANT CHANGE UP (ref 7–14)
APTT BLD: 32.5 SEC — SIGNIFICANT CHANGE UP (ref 27–39.2)
BLD GP AB SCN SERPL QL: SIGNIFICANT CHANGE UP
BUN SERPL-MCNC: 14 MG/DL — SIGNIFICANT CHANGE UP (ref 10–20)
CALCIUM SERPL-MCNC: 9.2 MG/DL — SIGNIFICANT CHANGE UP (ref 8.5–10.1)
CHLORIDE SERPL-SCNC: 101 MMOL/L — SIGNIFICANT CHANGE UP (ref 98–110)
CO2 SERPL-SCNC: 23 MMOL/L — SIGNIFICANT CHANGE UP (ref 17–32)
CREAT SERPL-MCNC: 1 MG/DL — SIGNIFICANT CHANGE UP (ref 0.7–1.5)
GLUCOSE BLDC GLUCOMTR-MCNC: 102 MG/DL — HIGH (ref 70–99)
GLUCOSE BLDC GLUCOMTR-MCNC: 117 MG/DL — HIGH (ref 70–99)
GLUCOSE BLDC GLUCOMTR-MCNC: 167 MG/DL — HIGH (ref 70–99)
GLUCOSE BLDC GLUCOMTR-MCNC: 99 MG/DL — SIGNIFICANT CHANGE UP (ref 70–99)
GLUCOSE SERPL-MCNC: 112 MG/DL — HIGH (ref 70–99)
HCT VFR BLD CALC: 39.9 % — LOW (ref 42–52)
HGB BLD-MCNC: 13.3 G/DL — LOW (ref 14–18)
INR BLD: 1.04 RATIO — SIGNIFICANT CHANGE UP (ref 0.65–1.3)
MAGNESIUM SERPL-MCNC: 2.1 MG/DL — SIGNIFICANT CHANGE UP (ref 1.8–2.4)
MCHC RBC-ENTMCNC: 25.6 PG — LOW (ref 27–31)
MCHC RBC-ENTMCNC: 33.3 G/DL — SIGNIFICANT CHANGE UP (ref 32–37)
MCV RBC AUTO: 76.9 FL — LOW (ref 80–94)
NRBC # BLD: 0 /100 WBCS — SIGNIFICANT CHANGE UP (ref 0–0)
PLATELET # BLD AUTO: 197 K/UL — SIGNIFICANT CHANGE UP (ref 130–400)
POTASSIUM SERPL-MCNC: 4.6 MMOL/L — SIGNIFICANT CHANGE UP (ref 3.5–5)
POTASSIUM SERPL-SCNC: 4.6 MMOL/L — SIGNIFICANT CHANGE UP (ref 3.5–5)
PROTHROM AB SERPL-ACNC: 12 SEC — SIGNIFICANT CHANGE UP (ref 9.95–12.87)
RBC # BLD: 5.19 M/UL — SIGNIFICANT CHANGE UP (ref 4.7–6.1)
RBC # FLD: 13.7 % — SIGNIFICANT CHANGE UP (ref 11.5–14.5)
SODIUM SERPL-SCNC: 137 MMOL/L — SIGNIFICANT CHANGE UP (ref 135–146)
TYPE + AB SCN PNL BLD: SIGNIFICANT CHANGE UP
WBC # BLD: 6.44 K/UL — SIGNIFICANT CHANGE UP (ref 4.8–10.8)
WBC # FLD AUTO: 6.44 K/UL — SIGNIFICANT CHANGE UP (ref 4.8–10.8)

## 2019-04-02 RX ORDER — SODIUM CHLORIDE 9 MG/ML
1000 INJECTION INTRAMUSCULAR; INTRAVENOUS; SUBCUTANEOUS
Qty: 0 | Refills: 0 | Status: DISCONTINUED | OUTPATIENT
Start: 2019-04-02 | End: 2019-04-02

## 2019-04-02 RX ORDER — SODIUM CHLORIDE 9 MG/ML
1000 INJECTION INTRAMUSCULAR; INTRAVENOUS; SUBCUTANEOUS
Qty: 0 | Refills: 0 | Status: DISCONTINUED | OUTPATIENT
Start: 2019-04-02 | End: 2019-04-03

## 2019-04-02 RX ADMIN — HEPARIN SODIUM 5000 UNIT(S): 5000 INJECTION INTRAVENOUS; SUBCUTANEOUS at 21:35

## 2019-04-02 RX ADMIN — SODIUM CHLORIDE 75 MILLILITER(S): 9 INJECTION INTRAMUSCULAR; INTRAVENOUS; SUBCUTANEOUS at 13:23

## 2019-04-02 RX ADMIN — ATORVASTATIN CALCIUM 80 MILLIGRAM(S): 80 TABLET, FILM COATED ORAL at 21:35

## 2019-04-02 RX ADMIN — Medication 50 MILLIGRAM(S): at 17:02

## 2019-04-02 RX ADMIN — Medication 81 MILLIGRAM(S): at 17:02

## 2019-04-02 RX ADMIN — Medication 50 MILLIGRAM(S): at 05:39

## 2019-04-02 RX ADMIN — Medication 3 UNIT(S): at 17:03

## 2019-04-02 RX ADMIN — Medication 1: at 17:02

## 2019-04-02 RX ADMIN — HEPARIN SODIUM 5000 UNIT(S): 5000 INJECTION INTRAVENOUS; SUBCUTANEOUS at 13:23

## 2019-04-02 RX ADMIN — HEPARIN SODIUM 5000 UNIT(S): 5000 INJECTION INTRAVENOUS; SUBCUTANEOUS at 05:42

## 2019-04-02 NOTE — PROGRESS NOTE ADULT - ASSESSMENT
41 year old male being evaluated for following medical problems    Ischemic Stroke; possibly Transient  -Negative CTA head and neck and CT head shows chronic microvascular changes  -Echo w/ EF 66%, moderate-severe left atrial enlargement, GIDD, torn tertiary chord to anterior mitral leaflet, pulmonic valve regurgitation, mild TR  -MR Head showing small focus consistent with acute infarct in posterior frontal periventricular white matters, chronic infarct involving frontal white matter and genu of the corpus callosum with focal atrophy of the genu of the corpus callosum  -c/w telemetry monitoring  -Currently NIHSS is 0  -Keep SBP between 140-180 as per neuro  -Keep HOB at 30  -Neuro-checks every 4 hours  -c/w aspirin and atorvastatin for now  -Lipid profile wnl  -Follow up HbA1c  -Speech and swallow evaluation not needed, passed dysphagia screen with no issues  -ZAY w/ bubble study as per neuro tomorrow (will be performed by Dr. Zapien); NPO after midnight  -Rehab: Outpatient w/ [facility]  -Neurology on board    Hypertension  -c/w lisinopril  -holding amlodipine to allow for elevated BP  -c/w metoprolol (no Bystolic in the hospital)    Diabetes Mellitus  -On lispro 3, lantus 9, LSS    Full Code  Comes from home  On DVT prophylaxis: heparin subq 43 year old male being evaluated for following medical problems    Ischemic Stroke; possibly Transient  -Negative CTA head and neck and CT head shows chronic microvascular changes  -Echo w/ EF 66%, moderate-severe left atrial enlargement, GIDD, torn tertiary chord to anterior mitral leaflet, pulmonic valve regurgitation, mild TR  -MR Head showing small focus consistent with acute infarct in posterior frontal periventricular white matters, chronic infarct involving frontal white matter and genu of the corpus callosum with focal atrophy of the genu of the corpus callosum  -c/w telemetry monitoring  -Currently NIHSS is 0  -Keep SBP between 140-180 as per neuro  -Keep HOB at 30  -Neuro-checks every 4 hours  -c/w aspirin and atorvastatin for now  -Lipid profile wnl  -Follow up HbA1c  -Speech and swallow evaluation not needed, passed dysphagia screen with no issues  -ZAY w/ bubble study as per neuro tomorrow (will be performed by Dr. Zapien); NPO after midnight  -Rehab: Outpatient w/ [facility]  -Neurology on board    Hypertension  -holding Ace, amlodipine to allow for elevated BP  -c/w metoprolol (no Bystolic in the hospital)    Diabetes Mellitus  -On lispro 3, lantus 9, LSS    Full Code  Comes from home  On DVT prophylaxis: heparin subq

## 2019-04-02 NOTE — PROGRESS NOTE ADULT - ASSESSMENT
Impression:  42 y/o male with acute left frontal lobe infarct    Plan:  ZAY today, discharge plan for afterward unless markedly abnormal  ASA 81 mg QD  Lipitor 80 mg QD  f/u hypercoag labs  Outpatient Stroke Clinic f/u 1 week Impression:  40 y/o male with acute left frontal lobe infarct    Plan:  ZAY today, discharge plan for afterward unless markedly abnormal  ASA 81 mg QD  Lipitor 80 mg QD  f/u hypercoag labs  Outpatient Stroke Clinic f/u 1 week  outpatient event monitor Impression:  40 y/o male with acute left frontal lobe infarct    Plan:  ZAY today, discharge plan for afterward unless markedly abnormal  ASA 81 mg QD  Lipitor 80 mg QD  f/u hypercoag labs  Diabetic education  Outpatient Stroke Clinic f/u 1 week  outpatient event monitor

## 2019-04-02 NOTE — CONSULT NOTE ADULT - ASSESSMENT
pt with Hx of rheumatic fever,  HTN , DM presenetd for TIA Symptoms. neurology requesting ZAY to r/o cardio embolic source.    Acute stroke : + RENEE findings                 telemetry no arrhyhtmia              ECG sinus                 2d echo showed dilated LA    recommendation:  keep NPO for ZAY  continue with  asa/high intensity statin  will follow

## 2019-04-02 NOTE — CONSULT NOTE ADULT - SUBJECTIVE AND OBJECTIVE BOX
HPI:  41 year old male with pertinent medical history of Hypertension and Diabetes Mellitus presents to the ED with acute onset of weakness. He was in normal state of health watching TV when around 430 am in the morning he had symptoms of right hand weakness. It was associated with numbness and tingling in the same hand. He decided to get up and found his right leg felt slightly weak and unstable. He started exercising and did some squats to find his right side more unsteady than the right. He took his Blood pressure and found out that it was high (above 170/125) and his fingerstick was high in 300s. He denies any headache, dizziness, Nausea or Vomiting and any visual changes.  In ED stroke code was called, NIHSS is 1, patient has right sided dysmetria. (31 Mar 2019 11:48)      cardiology note:  pt with Hx of rheumatic fever,  HTN , DM presenetd for TIA Symptoms. neurology requesting ZAY to r/o cardio embolic source.    PAST MEDICAL & SURGICAL HISTORY  Diabetes mellitus  Hypertension  No significant past surgical history      FAMILY HISTORY:  FAMILY HISTORY:  Family history of kidney stones: Father      SOCIAL HISTORY:  []smoker  []Alcohol  []Drug    ALLERGIES:  No Known Allergies      MEDICATIONS:  MEDICATIONS  (STANDING):  aspirin  chewable 81 milliGRAM(s) Oral daily  atorvastatin 80 milliGRAM(s) Oral at bedtime  dextrose 5%. 1000 milliLiter(s) (50 mL/Hr) IV Continuous <Continuous>  dextrose 50% Injectable 12.5 Gram(s) IV Push once  heparin  Injectable 5000 Unit(s) SubCutaneous every 8 hours  insulin glargine Injectable (LANTUS) 9 Unit(s) SubCutaneous at bedtime  insulin lispro (HumaLOG) corrective regimen sliding scale   SubCutaneous three times a day before meals  insulin lispro Injectable (HumaLOG) 3 Unit(s) SubCutaneous three times a day before meals  metoprolol tartrate 50 milliGRAM(s) Oral two times a day  sodium chloride 0.9%. 1000 milliLiter(s) (75 mL/Hr) IV Continuous <Continuous>    MEDICATIONS  (PRN):  dextrose 40% Gel 15 Gram(s) Oral once PRN Blood Glucose LESS THAN 70 milliGRAM(s)/deciliter  glucagon  Injectable 1 milliGRAM(s) IntraMuscular once PRN Glucose LESS THAN 70 milligrams/deciliter      HOME MEDICATIONS:  Home Medications:  amLODIPine 10 mg oral tablet: 1 tab(s) orally once a day (31 Mar 2019 07:32)  Bystolic 10 mg oral tablet: 1 tab(s) orally once a day (31 Mar 2019 07:32)  glimepiride 2 mg oral tablet: 1 tab(s) orally once a day (31 Mar 2019 07:32)  Janumet 50 mg-1000 mg oral tablet: 1 tab(s) orally 2 times a day (31 Mar 2019 07:32)  lisinopril 40 mg oral tablet: 1 tab(s) orally once a day (31 Mar 2019 07:32)      VITALS:   T(F): 96.6 (04-02 @ 05:59), Max: 98.6 (04-01 @ 17:56)  HR: 58 (04-02 @ 05:59) (54 - 83)  BP: 132/74 (04-02 @ 05:59) (116/70 - 172/97)  BP(mean): --  RR: 17 (04-02 @ 05:59) (16 - 18)  SpO2: 99% (04-01 @ 02:29) (96% - 100%)    I&O's Summary      REVIEW OF SYSTEMS:  CONSTITUTIONAL: No weakness, fevers or chills  EYES/ENT: No visual changes;  No vertigo or throat pain   NECK: No pain or stiffness  RESPIRATORY: No cough, wheezing, hemoptysis; No shortness of breath  CARDIOVASCULAR: No chest pain or palpitations  GASTROINTESTINAL: No abdominal or epigastric pain. No nausea, vomiting, or hematemesis; No diarrhea or constipation. No melena or hematochezia.  GENITOURINARY: No dysuria, frequency or hematuria      PHYSICAL EXAM:  NEURO: patient is awake , alert and oriented  GEN: Not in acute distress  NECK:  no JVD  LUNGS: Clear to auscultation bilaterally   CARDIOVASCULAR: S1/S2 present, RRR , no murmurs   ABD: Soft, non-tender, non-distended, +BS  EXT: No SARI    LABS:                        13.3   6.44  )-----------( 197      ( 02 Apr 2019 05:10 )             39.9     04-02    137  |  101  |  14  ----------------------------<  112<H>  4.6   |  23  |  1.0    Ca    9.2      02 Apr 2019 05:10  Mg     2.1     04-02    TPro  7.5  /  Alb  4.0  /  TBili  0.4  /  DBili  <0.2  /  AST  23  /  ALT  29  /  AlkPhos  70  04-01    PT/INR - ( 02 Apr 2019 05:10 )   PT: 12.00 sec;   INR: 1.04 ratio         PTT - ( 02 Apr 2019 05:10 )  PTT:32.5 sec    CARDIAC MARKERS ( 31 Mar 2019 20:44 )  x     / <0.01 ng/mL / x     / x     / x          04-01 Chol 144  HDL 39<L> Trig 113    RADIOLOGY:  -CXR: < from: Xray Chest 1 View AP/PA (03.31.19 @ 08:09) >    Impression:      No radiographic evidence of acute cardiopulmonary disease.    < end of copied text >    -TTE: < from: Transthoracic Echocardiogram (03.31.19 @ 16:12) >   1. Normal global left ventricular systolic function.   2. Moderate to severe left atrial enlargement.   3. LV Ejection Fraction by Holm's Methodwith a biplane EF of 66 %.   4. Elevated left atrial and left ventricular end-diastolic pressures.   5. Mildly increased LV wall thickness.   6. Normal left ventricular internal cavity size.   7. Spectral Doppler shows impaired relaxation pattern of left   ventricular myocardial filling (Grade I diastolic dysfunction).   8. There is mild concentric left ventricular hypertrophy.   9. The mean global longitudinal strain by speckle tracking is -16.3%   whcih is borderline.  10. There is a torn teritary chord to the anterior mitral leaflet. There   is also an elongated chord to the anterior mitral leaflet.  11. Mild tricuspid regurgitation.  12. Pulmonic valve regurgitation.  13. LA volume Index is 46.0 ml/m² ml/m2.    < end of copied text >        ECG:  sinus with LVH HPI:  41 year old male with pertinent medical history of Hypertension and Diabetes Mellitus presents to the ED with acute onset of weakness. He was in normal state of health watching TV when around 430 am in the morning he had symptoms of right hand weakness. It was associated with numbness and tingling in the same hand. He decided to get up and found his right leg felt slightly weak and unstable. He started exercising and did some squats to find his right side more unsteady than the right. He took his Blood pressure and found out that it was high (above 170/125) and his fingerstick was high in 300s. He denies any headache, dizziness, Nausea or Vomiting and any visual changes.  In ED stroke code was called, NIHSS is 1, patient has right sided dysmetria. (31 Mar 2019 11:48)      cardiology note:  pt with Hx of rheumatic fever,  HTN , DM presenetd for TIA Symptoms. neurology requesting ZAY to r/o cardio embolic source.    PAST MEDICAL & SURGICAL HISTORY  Diabetes mellitus  Hypertension  No significant past surgical history      FAMILY HISTORY:  FAMILY HISTORY:  Family history of kidney stones: Father      SOCIAL HISTORY:  [x]smoker  []Alcohol  []Drug    ALLERGIES:  No Known Allergies      MEDICATIONS:  MEDICATIONS  (STANDING):  aspirin  chewable 81 milliGRAM(s) Oral daily  atorvastatin 80 milliGRAM(s) Oral at bedtime  dextrose 5%. 1000 milliLiter(s) (50 mL/Hr) IV Continuous <Continuous>  dextrose 50% Injectable 12.5 Gram(s) IV Push once  heparin  Injectable 5000 Unit(s) SubCutaneous every 8 hours  insulin glargine Injectable (LANTUS) 9 Unit(s) SubCutaneous at bedtime  insulin lispro (HumaLOG) corrective regimen sliding scale   SubCutaneous three times a day before meals  insulin lispro Injectable (HumaLOG) 3 Unit(s) SubCutaneous three times a day before meals  metoprolol tartrate 50 milliGRAM(s) Oral two times a day  sodium chloride 0.9%. 1000 milliLiter(s) (75 mL/Hr) IV Continuous <Continuous>    MEDICATIONS  (PRN):  dextrose 40% Gel 15 Gram(s) Oral once PRN Blood Glucose LESS THAN 70 milliGRAM(s)/deciliter  glucagon  Injectable 1 milliGRAM(s) IntraMuscular once PRN Glucose LESS THAN 70 milligrams/deciliter      HOME MEDICATIONS:  Home Medications:  amLODIPine 10 mg oral tablet: 1 tab(s) orally once a day (31 Mar 2019 07:32)  Bystolic 10 mg oral tablet: 1 tab(s) orally once a day (31 Mar 2019 07:32)  glimepiride 2 mg oral tablet: 1 tab(s) orally once a day (31 Mar 2019 07:32)  Janumet 50 mg-1000 mg oral tablet: 1 tab(s) orally 2 times a day (31 Mar 2019 07:32)  lisinopril 40 mg oral tablet: 1 tab(s) orally once a day (31 Mar 2019 07:32)      VITALS:   T(F): 96.6 (04-02 @ 05:59), Max: 98.6 (04-01 @ 17:56)  HR: 58 (04-02 @ 05:59) (54 - 83)  BP: 132/74 (04-02 @ 05:59) (116/70 - 172/97)  BP(mean): --  RR: 17 (04-02 @ 05:59) (16 - 18)  SpO2: 99% (04-01 @ 02:29) (96% - 100%)    I&O's Summary      REVIEW OF SYSTEMS:  CONSTITUTIONAL: No weakness, fevers or chills  EYES/ENT: No visual changes;  No vertigo or throat pain   NECK: No pain or stiffness  RESPIRATORY: No cough, wheezing, hemoptysis; No shortness of breath  CARDIOVASCULAR: No chest pain or palpitations  GASTROINTESTINAL: No abdominal or epigastric pain. No nausea, vomiting, or hematemesis; No diarrhea or constipation. No melena or hematochezia.  GENITOURINARY: No dysuria, frequency or hematuria      PHYSICAL EXAM:  NEURO: patient is awake , alert and oriented  GEN: Not in acute distress  NECK:  no JVD  LUNGS: Clear to auscultation bilaterally   CARDIOVASCULAR: S1/S2 present, RRR , no murmurs   ABD: Soft, non-tender, non-distended, +BS  EXT: No SARI    LABS:                        13.3   6.44  )-----------( 197      ( 02 Apr 2019 05:10 )             39.9     04-02    137  |  101  |  14  ----------------------------<  112<H>  4.6   |  23  |  1.0    Ca    9.2      02 Apr 2019 05:10  Mg     2.1     04-02    TPro  7.5  /  Alb  4.0  /  TBili  0.4  /  DBili  <0.2  /  AST  23  /  ALT  29  /  AlkPhos  70  04-01    PT/INR - ( 02 Apr 2019 05:10 )   PT: 12.00 sec;   INR: 1.04 ratio         PTT - ( 02 Apr 2019 05:10 )  PTT:32.5 sec    CARDIAC MARKERS ( 31 Mar 2019 20:44 )  x     / <0.01 ng/mL / x     / x     / x          04-01 Chol 144  HDL 39<L> Trig 113    RADIOLOGY:  -CXR: < from: Xray Chest 1 View AP/PA (03.31.19 @ 08:09) >    Impression:      No radiographic evidence of acute cardiopulmonary disease.    < end of copied text >    -TTE: < from: Transthoracic Echocardiogram (03.31.19 @ 16:12) >   1. Normal global left ventricular systolic function.   2. Moderate to severe left atrial enlargement.   3. LV Ejection Fraction by Holm's Methodwith a biplane EF of 66 %.   4. Elevated left atrial and left ventricular end-diastolic pressures.   5. Mildly increased LV wall thickness.   6. Normal left ventricular internal cavity size.   7. Spectral Doppler shows impaired relaxation pattern of left   ventricular myocardial filling (Grade I diastolic dysfunction).   8. There is mild concentric left ventricular hypertrophy.   9. The mean global longitudinal strain by speckle tracking is -16.3%   whcih is borderline.  10. There is a torn teritary chord to the anterior mitral leaflet. There   is also an elongated chord to the anterior mitral leaflet.  11. Mild tricuspid regurgitation.  12. Pulmonic valve regurgitation.  13. LA volume Index is 46.0 ml/m² ml/m2.    < end of copied text >        ECG:  sinus with LVH

## 2019-04-03 ENCOUNTER — TRANSCRIPTION ENCOUNTER (OUTPATIENT)
Age: 44
End: 2019-04-03

## 2019-04-03 VITALS — WEIGHT: 195.11 LBS | HEIGHT: 65 IN

## 2019-04-03 LAB
ANION GAP SERPL CALC-SCNC: 14 MMOL/L — SIGNIFICANT CHANGE UP (ref 7–14)
AT III ACT/NOR PPP CHRO: 109 % — SIGNIFICANT CHANGE UP (ref 85–135)
BUN SERPL-MCNC: 21 MG/DL — HIGH (ref 10–20)
CALCIUM SERPL-MCNC: 9.2 MG/DL — SIGNIFICANT CHANGE UP (ref 8.5–10.1)
CARDIOLIPIN AB SER-ACNC: NEGATIVE — SIGNIFICANT CHANGE UP
CHLORIDE SERPL-SCNC: 103 MMOL/L — SIGNIFICANT CHANGE UP (ref 98–110)
CO2 SERPL-SCNC: 22 MMOL/L — SIGNIFICANT CHANGE UP (ref 17–32)
CREAT SERPL-MCNC: 1 MG/DL — SIGNIFICANT CHANGE UP (ref 0.7–1.5)
GLUCOSE BLDC GLUCOMTR-MCNC: 115 MG/DL — HIGH (ref 70–99)
GLUCOSE BLDC GLUCOMTR-MCNC: 116 MG/DL — HIGH (ref 70–99)
GLUCOSE BLDC GLUCOMTR-MCNC: 203 MG/DL — HIGH (ref 70–99)
GLUCOSE BLDC GLUCOMTR-MCNC: 86 MG/DL — SIGNIFICANT CHANGE UP (ref 70–99)
GLUCOSE SERPL-MCNC: 104 MG/DL — HIGH (ref 70–99)
HCT VFR BLD CALC: 39.9 % — LOW (ref 42–52)
HGB BLD-MCNC: 12.8 G/DL — LOW (ref 14–18)
MAGNESIUM SERPL-MCNC: 2 MG/DL — SIGNIFICANT CHANGE UP (ref 1.8–2.4)
MCHC RBC-ENTMCNC: 24.9 PG — LOW (ref 27–31)
MCHC RBC-ENTMCNC: 32.1 G/DL — SIGNIFICANT CHANGE UP (ref 32–37)
MCV RBC AUTO: 77.5 FL — LOW (ref 80–94)
NRBC # BLD: 0 /100 WBCS — SIGNIFICANT CHANGE UP (ref 0–0)
PLATELET # BLD AUTO: 195 K/UL — SIGNIFICANT CHANGE UP (ref 130–400)
POTASSIUM SERPL-MCNC: 4.5 MMOL/L — SIGNIFICANT CHANGE UP (ref 3.5–5)
POTASSIUM SERPL-SCNC: 4.5 MMOL/L — SIGNIFICANT CHANGE UP (ref 3.5–5)
RBC # BLD: 5.15 M/UL — SIGNIFICANT CHANGE UP (ref 4.7–6.1)
RBC # FLD: 13.5 % — SIGNIFICANT CHANGE UP (ref 11.5–14.5)
SODIUM SERPL-SCNC: 139 MMOL/L — SIGNIFICANT CHANGE UP (ref 135–146)
WBC # BLD: 6.31 K/UL — SIGNIFICANT CHANGE UP (ref 4.8–10.8)
WBC # FLD AUTO: 6.31 K/UL — SIGNIFICANT CHANGE UP (ref 4.8–10.8)

## 2019-04-03 RX ORDER — ATORVASTATIN CALCIUM 80 MG/1
1 TABLET, FILM COATED ORAL
Qty: 21 | Refills: 0
Start: 2019-04-03 | End: 2019-04-23

## 2019-04-03 RX ORDER — ASPIRIN/CALCIUM CARB/MAGNESIUM 324 MG
1 TABLET ORAL
Qty: 21 | Refills: 0
Start: 2019-04-03 | End: 2019-04-23

## 2019-04-03 RX ORDER — PIOGLITAZONE HYDROCHLORIDE 15 MG/1
1 TABLET ORAL
Qty: 30 | Refills: 0
Start: 2019-04-03

## 2019-04-03 RX ADMIN — HEPARIN SODIUM 5000 UNIT(S): 5000 INJECTION INTRAVENOUS; SUBCUTANEOUS at 05:31

## 2019-04-03 RX ADMIN — Medication 3 UNIT(S): at 12:20

## 2019-04-03 RX ADMIN — SODIUM CHLORIDE 75 MILLILITER(S): 9 INJECTION INTRAMUSCULAR; INTRAVENOUS; SUBCUTANEOUS at 08:03

## 2019-04-03 RX ADMIN — Medication 81 MILLIGRAM(S): at 12:15

## 2019-04-03 RX ADMIN — Medication 2: at 12:20

## 2019-04-03 RX ADMIN — SODIUM CHLORIDE 75 MILLILITER(S): 9 INJECTION INTRAMUSCULAR; INTRAVENOUS; SUBCUTANEOUS at 00:20

## 2019-04-03 RX ADMIN — Medication 50 MILLIGRAM(S): at 05:31

## 2019-04-03 NOTE — PROGRESS NOTE ADULT - SUBJECTIVE AND OBJECTIVE BOX
Patient seen and examined at bedside, he continues to report numbness of his right hand which has improved since onset but denies any new complaints. He does report feeling weak in his right leg while ambulating but there is no objective weakness. No acute events on the cardiac monitor.        PMH  Diabetes mellitus  Hypertension      Pertinent Medical History/Social History/Family History/other:     Social History: []  Drug Use: []   Alcohol Use: []   Tobacco Use:  [] Other:      Cerebrovascular Risk Factors:[] prior ischemic stroke  [] Afib  []CAD  [X]HTN  []DLD  []DM []PVD      Stroke Workup:    Cardiac Rhythm(Tele/Holter) & Duration:                   Events: none    Cardiac Structure:(TTE/ZAY +/-): pending results      RADIOLOGY  < from: CT Angio Neck /HEAD w/ IV Cont (19 @ 09:50) >  CTA Neck:     Normal origin of the innominate, left common carotid and left subclavian   arteries. The common carotid arteries are patent without dissection or   flow-limiting stenosis.     The internal carotid arteries of the neck are patent without stenosis or   dissection. The right common carotid artery demonstrates a   retropharyngeal course, at C2-3 level, normal variant.    The right vertebral artery is dominant with a diminutive left vertebral   artery. Bilateral vertebral arteries are patent without evidence of   dissection or significant stenosis on the right.      CTA Head:    Bilateral internal carotid arteries, MCAs and ACAs are patent without   evidence of thrombosis or flow limiting stenosis.The basal artery is   patent. PICA, AICA and SCA are patent. PCAs are patent bilaterally.     No aneurysm or vascular malformation is noted.      Impression:    1.  Unremarkable CT angiogram of the head and neck.    2.  Incidentally noted retropharyngeal course of the right internal   carotid artery, normal variant.       < end of copied text >      Home Medications:  amLODIPine 10 mg oral tablet: 1 tab(s) orally once a day (31 Mar 2019 07:32)  Bystolic 10 mg oral tablet: 1 tab(s) orally once a day (31 Mar 2019 07:32)  glimepiride 2 mg oral tablet: 1 tab(s) orally once a day (31 Mar 2019 07:32)  Janumet 50 mg-1000 mg oral tablet: 1 tab(s) orally 2 times a day (31 Mar 2019 07:32)  lisinopril 40 mg oral tablet: 1 tab(s) orally once a day (31 Mar 2019 07:32)      MEDICATIONS  (STANDING):  amLODIPine   Tablet 10 milliGRAM(s) Oral daily  aspirin Suppository 300 milliGRAM(s) Rectal daily  atorvastatin 40 milliGRAM(s) Oral at bedtime  dextrose 5%. 1000 milliLiter(s) (50 mL/Hr) IV Continuous <Continuous>  dextrose 50% Injectable 12.5 Gram(s) IV Push once  heparin  Injectable 5000 Unit(s) SubCutaneous every 8 hours  insulin glargine Injectable (LANTUS) 9 Unit(s) SubCutaneous at bedtime  insulin lispro (HumaLOG) corrective regimen sliding scale   SubCutaneous three times a day before meals  insulin lispro Injectable (HumaLOG) 3 Unit(s) SubCutaneous three times a day before meals  lisinopril 40 milliGRAM(s) Oral daily  metoprolol tartrate 50 milliGRAM(s) Oral two times a day  sodium chloride 0.9%. 1000 milliLiter(s) (75 mL/Hr) IV Continuous <Continuous>      Last 24 hour events: none    Physical Exam:  Patient a/o x 4 speech intact  cn: intact  power: 5/5 throughout/ no drift  ftn : mild dysmetria on the right  hks : no ataxia  sensory: subjective numbness on the right hand, no objective sensory loss  gait: deferred    Vital Signs Last 24 Hrs  T(C): 35.8 (2019 02:29), Max: 36.7 (31 Mar 2019 09:30)  T(F): 96.5 (2019 02:29), Max: 98.1 (31 Mar 2019 09:30)  HR: 65 (2019 02:29) (54 - 69)  BP: 142/88 (2019 02:29) (126/58 - 172/97)  BP(mean): --  RR: 18 (2019 02:29) (16 - 18)  SpO2: 99% (2019 02:29) (96% - 100%)    NIHSS  LOC: 0    QUES: 0    COMM:0     VF: 0    GAZE: 0    RUE:  0   RLE:  0   LUE:  0   LLE:  0   SENS:0     LAN   SPEECH:   0  ATAXIA:  1 (right )   NEGLECT: 0    FACE: 0     NIHSS today: 1      PT/OT/Speech/Rehab/other: pending     m-RS: 2
CHIEF COMPLAINT:    Patient is a 43y old Male who presents with a chief complaint of weakness (01 Apr 2019 03:39)    Currently admitted to medicine with the primary diagnosis of Arm numbness     Today is hospital day 1d. This morning he is resting comfortably in bed and reports no new issues or overnight events.     PAST MEDICAL & SURGICAL HISTORY  Diabetes mellitus  Hypertension  No significant past surgical history      ALLERGIES:  No Known Allergies    MEDICATIONS:  STANDING MEDICATIONS  aspirin  chewable 81 milliGRAM(s) Oral daily  atorvastatin 80 milliGRAM(s) Oral at bedtime  dextrose 5%. 1000 milliLiter(s) IV Continuous <Continuous>  dextrose 50% Injectable 12.5 Gram(s) IV Push once  heparin  Injectable 5000 Unit(s) SubCutaneous every 8 hours  insulin glargine Injectable (LANTUS) 9 Unit(s) SubCutaneous at bedtime  insulin lispro (HumaLOG) corrective regimen sliding scale   SubCutaneous three times a day before meals  insulin lispro Injectable (HumaLOG) 3 Unit(s) SubCutaneous three times a day before meals  lisinopril 40 milliGRAM(s) Oral daily  metoprolol tartrate 50 milliGRAM(s) Oral two times a day    PRN MEDICATIONS  dextrose 40% Gel 15 Gram(s) Oral once PRN  glucagon  Injectable 1 milliGRAM(s) IntraMuscular once PRN    VITALS:   T(F): 97.2  HR: 63  BP: 147/94  RR: 18  SpO2: 99%    LABS:                        12.9   7.09  )-----------( 191      ( 31 Mar 2019 20:44 )             39.3     04-01    138  |  103  |  12  ----------------------------<  102<H>  4.3   |  25  |  0.9    Ca    9.3      01 Apr 2019 04:44  Mg     2.3     03-31    TPro  7.5  /  Alb  4.0  /  TBili  0.4  /  DBili  <0.2  /  AST  23  /  ALT  29  /  AlkPhos  70  04-01    PT/INR - ( 01 Apr 2019 04:44 )   PT: 12.30 sec;   INR: 1.07 ratio    PTT - ( 01 Apr 2019 04:44 )  PTT:32.9 sec    Troponin T, Serum: <0.01 ng/mL (03-31-19 @ 20:44)    CARDIAC MARKERS ( 31 Mar 2019 20:44 )  x     / <0.01 ng/mL / x     / x     / x      CARDIAC MARKERS ( 31 Mar 2019 07:26 )  x     / <0.01 ng/mL / x     / x     / x        RADIOLOGY:  < from: CT Angio Neck w/ IV Cont (03.31.19 @ 09:50) >  1.  Unremarkable CT angiogram of the head and neck.    2.  Incidentally noted retropharyngeal course of the right internal carotid artery, normal variant.   < end of copied text >    < from: CT Brain Stroke Protocol (03.31.19 @ 07:32) >  No CT evidence for acute intracranial pathology.     Periventricular white matter chronic microvascular changes.    < end of copied text >    < from: Transthoracic Echocardiogram (03.31.19 @ 16:12) >   1. Normal global left ventricular systolic function.   2. Moderate to severe left atrial enlargement.   3. LV Ejection Fraction by Holm's Methodwith a biplane EF of 66 %.   4. Elevated left atrial and left ventricular end-diastolic pressures.   5. Mildly increased LV wall thickness.   6. Normal left ventricular internal cavity size.   7. Spectral Doppler shows impaired relaxation pattern of left ventricular myocardial filling (Grade I diastolic dysfunction).   8. There is mild concentric left ventricular hypertrophy.   9. The mean global longitudinal strain by speckle tracking is -16.3% which is borderline.  10. There is a torn teritary chord to the anterior mitral leaflet. There is also an elongated chord to the anterior mitral leaflet.  11. Mild tricuspid regurgitation.  12. Pulmonic valve regurgitation.  13. LA volume Index is 46.0 ml/m² ml/m2.  < end of copied text >    PHYSICAL EXAM:  GEN: No acute distress  PULM: Clear to auscultation bilaterally   CARD: S1/S2 present. RRR.   GI: Soft, non-tender, non-distended. Bowel sounds present  NEURO: AAOX4. NIHSS is 0. Negative Romberg. No issues with heel-to-shin observed, but patient describes qualitative difficulties w/ R leg on L shin. No issues with finger to nose. Follows complex commands with no issue.
CHIEF COMPLAINT:    Patient is a 43y old Male who presents with a chief complaint of weakness (02 Apr 2019 09:53)    Currently admitted to medicine with the primary diagnosis of Arm numbness     Today is hospital day 2d. This morning he is resting comfortably in bed and reports no new issues or overnight events.     PAST MEDICAL & SURGICAL HISTORY  Diabetes mellitus  Hypertension  No significant past surgical history      ALLERGIES:  No Known Allergies    MEDICATIONS:  STANDING MEDICATIONS  aspirin  chewable 81 milliGRAM(s) Oral daily  atorvastatin 80 milliGRAM(s) Oral at bedtime  dextrose 5%. 1000 milliLiter(s) IV Continuous <Continuous>  dextrose 50% Injectable 12.5 Gram(s) IV Push once  heparin  Injectable 5000 Unit(s) SubCutaneous every 8 hours  insulin glargine Injectable (LANTUS) 9 Unit(s) SubCutaneous at bedtime  insulin lispro (HumaLOG) corrective regimen sliding scale   SubCutaneous three times a day before meals  insulin lispro Injectable (HumaLOG) 3 Unit(s) SubCutaneous three times a day before meals  metoprolol tartrate 50 milliGRAM(s) Oral two times a day  sodium chloride 0.9%. 1000 milliLiter(s) IV Continuous <Continuous>    PRN MEDICATIONS  dextrose 40% Gel 15 Gram(s) Oral once PRN  glucagon  Injectable 1 milliGRAM(s) IntraMuscular once PRN    VITALS:   T(F): 96.6  HR: 58  BP: 132/74  RR: 17  SpO2: --    LABS:                        13.3   6.44  )-----------( 197      ( 02 Apr 2019 05:10 )             39.9     04-02    137  |  101  |  14  ----------------------------<  112<H>  4.6   |  23  |  1.0    Ca    9.2      02 Apr 2019 05:10  Mg     2.1     04-02    TPro  7.5  /  Alb  4.0  /  TBili  0.4  /  DBili  <0.2  /  AST  23  /  ALT  29  /  AlkPhos  70  04-01    PT/INR - ( 02 Apr 2019 05:10 )   PT: 12.00 sec;   INR: 1.04 ratio    PTT - ( 02 Apr 2019 05:10 )  PTT:32.5 sec    CARDIAC MARKERS ( 31 Mar 2019 20:44 )  x     / <0.01 ng/mL / x     / x     / x        RADIOLOGY:  < from: MR Head No Cont (03.31.19 @ 15:19) >  1.  Small focus of T2/FLAIR/diffusion hyperintense signal in the left posterior frontal periventricular white matter consistent with an acute infarct.    2.  Chronic infarct involving the frontal white matter and genu of the corpus callosum with focal atrophy of the genu of the corpus callosum.  < end of copied text >    < from: CT Angio Neck w/ IV Cont (03.31.19 @ 09:50) >  1.  Unremarkable CT angiogram of the head and neck.    2.  Incidentally noted retropharyngeal course of the right internal carotid artery, normal variant.   < end of copied text >    < from: CT Brain Stroke Protocol (03.31.19 @ 07:32) >  No CT evidence for acute intracranial pathology.     Periventricular white matter chronic microvascular changes.    < end of copied text >    < from: Transthoracic Echocardiogram (03.31.19 @ 16:12) >   1. Normal global left ventricular systolic function.   2. Moderate to severe left atrial enlargement.   3. LV Ejection Fraction by Holm's Methodwith a biplane EF of 66 %.   4. Elevated left atrial and left ventricular end-diastolic pressures.   5. Mildly increased LV wall thickness.   6. Normal left ventricular internal cavity size.   7. Spectral Doppler shows impaired relaxation pattern of left ventricular myocardial filling (Grade I diastolic dysfunction).   8. There is mild concentric left ventricular hypertrophy.   9. The mean global longitudinal strain by speckle tracking is -16.3% which is borderline.  10. There is a torn teritary chord to the anterior mitral leaflet. There is also an elongated chord to the anterior mitral leaflet.  11. Mild tricuspid regurgitation.  12. Pulmonic valve regurgitation.  13. LA volume Index is 46.0 ml/m² ml/m2.  < end of copied text >    PHYSICAL EXAM:  GEN: No acute distress  PULM: Clear to auscultation bilaterally   CARD: S1/S2 present. RRR.   GI: Soft, non-tender, non-distended. Bowel sounds present  NEURO: AAOX4. NIHSS is 0. Negative Romberg. No issues with heel-to-shin observed, but patient describes qualitative difficulties w/ R leg on L shin. No issues with finger to nose. Follows complex commands with no issue.
GUERO ESTRADA    Chief Complaint:    Handed    HPI:  41 year old male with pertinent medical history of Hypertension and Diabetes Mellitus presents to the ED with acute onset of weakness. He was in normal state of health watching TV when around 430 am in the morning he had symptoms of right hand weakness. It was associated with numbness and tingling in the same hand. He decided to get up and found his right leg felt slightly weak and unstable. He started exercising and did some squats to find his right side more unsteady than the right. He took his Blood pressure and found out that it was high (above 170/125) and his fingerstick was high in 300s. He denies any headache, dizziness, Nausea or Vomiting and any visual changes.  In ED stroke code was called, NIHSS is 1, patient has right sided dysmetria. (31 Mar 2019 11:48)    Patient is examined at the bedside, he is aaox3, no current complaints. Patient is s/p ZAY which showed mild MV regurge. MRI brain is positive for left postfrontal periventr wm infract.  Patient does not have dysmetria anymore, just mild right leg heaviness.   CTA is unremarkable.    Relevant PMH:  [] Prior ischemic stroke/TIA  [] Afib  []CAD  []HTN  []DLD  []DM []PVD []Obesity [] Sedintary lifestyle []CHF  []ROBERTO CARLOS  []Cancer Hx     Social History: [] Smoking []  Drug Use: []   Alcohol Use:   [] Other:      Possible Location of Stroke:    Possible Cause of Stroke:    Relevant Cerebral Imaging:    Relevant Cervicocerebral Imaging:  CT Angio Neck w/ IV Cont:   EXAM:  CT ANGIO NECK (W)AW IC        EXAM:  CT ANGIO BRAIN (W)AW IC            PROCEDURE DATE:  03/31/2019            INTERPRETATION:  Clinical History / Reason for exam: Stroke code.    Technique: CT angiogram of the head and neck was obtained after rapid   administration of intravenous contrast. Coronal, sagittal and 3-D   reformatted images are provided.    Comparison: None.       Findings:      CTA Neck:     Normal origin of the innominate, left common carotid and left subclavian   arteries. The common carotid arteries are patent without dissection or   flow-limiting stenosis.     The internal carotid arteries of the neck are patent without stenosis or   dissection. The right common carotid artery demonstrates a   retropharyngeal course, at C2-3 level, normal variant.    The right vertebral artery is dominant with a diminutive left vertebral   artery. Bilateral vertebral arteries are patent without evidence of   dissection or significant stenosis on the right.      CTA Head:    Bilateral internal carotid arteries, MCAs and ACAs are patent without   evidence of thrombosis or flow limiting stenosis.The basal artery is   patent. PICA, AICA and SCA are patent. PCAs are patent bilaterally.     No aneurysm or vascular malformation is noted.      Impression:    1.  Unremarkable CT angiogram of the head and neck.    2.  Incidentally noted retropharyngeal course of the right internal   carotid artery, normal variant.               CITLALY MOREJON M.D., RESIDENT RADIOLOGIST  This documenthas been electronically signed.  JACQUI FINCH M.D., ATTENDING RADIOLOGIST  This document has been electronically signed. Mar 31 2019  5:19PM             (03-31-19 @ 09:50)    CT Angio Head w/ IV Cont:   EXAM:  CT ANGIO NECK (W)AW IC        EXAM:  CT ANGIO BRAIN (W)AW IC            PROCEDURE DATE:  03/31/2019            INTERPRETATION:  Clinical History / Reason for exam: Stroke code.    Technique: CT angiogram of the head and neck was obtained after rapid   administration of intravenous contrast. Coronal, sagittal and 3-D   reformatted images are provided.    Comparison: None.       Findings:      CTA Neck:     Normal origin of the innominate, left common carotid and left subclavian   arteries. The common carotid arteries are patent without dissection or   flow-limiting stenosis.     The internal carotid arteries of the neck are patent without stenosis or   dissection. The right common carotid artery demonstrates a   retropharyngeal course, at C2-3 level, normal variant.    The right vertebral artery is dominant with a diminutive left vertebral   artery. Bilateral vertebral arteries are patent without evidence of   dissection or significant stenosis on the right.      CTA Head:    Bilateral internal carotid arteries, MCAs and ACAs are patent without   evidence of thrombosis or flow limiting stenosis.The basal artery is   patent. PICA, AICA and SCA are patent. PCAs are patent bilaterally.     No aneurysm or vascular malformation is noted.      Impression:    1.  Unremarkable CT angiogram of the head and neck.    2.  Incidentally noted retropharyngeal course of the right internal   carotid artery, normal variant.               CITLALY MOREJON M.D., RESIDENT RADIOLOGIST  This documenthas been electronically signed.  JACQUI FINCH M.D., ATTENDING RADIOLOGIST  This document has been electronically signed. Mar 31 2019  5:19PM             (03-31-19 @ 09:49)        Relevant blood tests:  Direct LDL: 100 mg/dL [4 - 129] (04-01-19 @ 04:44)  Hemoglobin A1C, Whole Blood: 9.2 % (04-01-19 @ 04:44)      Relevant cardiac rhythm monitoring:    Relevant Cardiac Structure:(TTE/ZAY +/-):[]No intracardiac thrombus/[] no vegetation/[]no [place]/EF:      Home Medications:  amLODIPine 10 mg oral tablet: 1 tab(s) orally once a day (31 Mar 2019 07:32)  Bystolic 10 mg oral tablet: 1 tab(s) orally once a day (31 Mar 2019 07:32)  glimepiride 2 mg oral tablet: 1 tab(s) orally once a day (31 Mar 2019 07:32)  Janumet 50 mg-1000 mg oral tablet: 1 tab(s) orally 2 times a day (31 Mar 2019 07:32)  lisinopril 40 mg oral tablet: 1 tab(s) orally once a day (31 Mar 2019 07:32)      MEDICATIONS  (STANDING):  aspirin  chewable 81 milliGRAM(s) Oral daily  atorvastatin 80 milliGRAM(s) Oral at bedtime  dextrose 5%. 1000 milliLiter(s) (50 mL/Hr) IV Continuous <Continuous>  dextrose 50% Injectable 12.5 Gram(s) IV Push once  heparin  Injectable 5000 Unit(s) SubCutaneous every 8 hours  insulin glargine Injectable (LANTUS) 9 Unit(s) SubCutaneous at bedtime  insulin lispro (HumaLOG) corrective regimen sliding scale   SubCutaneous three times a day before meals  insulin lispro Injectable (HumaLOG) 3 Unit(s) SubCutaneous three times a day before meals  metoprolol tartrate 50 milliGRAM(s) Oral two times a day      PT/OT/Speech/Rehab/S&Swr:    Exam:    Vital Signs Last 24 Hrs  T(C): 35.9 (03 Apr 2019 08:45), Max: 36.1 (02 Apr 2019 13:50)  T(F): 96.6 (03 Apr 2019 05:39), Max: 97 (02 Apr 2019 13:50)  HR: 65 (03 Apr 2019 08:45) (53 - 65)  BP: 129/74 (03 Apr 2019 08:45) (129/74 - 152/85)  BP(mean): --  RR: 18 (03 Apr 2019 08:45) (16 - 18)  SpO2: 98% (03 Apr 2019 02:35) (98% - 98%)    NIHSS      LOC:       1a:0     1b(Questions): 0          1c(Instructions):0             Best Gaze:0  Visual:0  Motor:                 RUE: 0    RLE:   0  LUE: 0    LLE: 0    FACE:   0  Limb Ataxia:0  Sensory:   0    Language:  0     Dysarthria:  0        Extinction and Inattention:0    NIHSS on admission: 1         NIHSS yesterday:  1        NIHSS today: 0            m-RS:0    Impression:      Suggestion:  Routine stroke workup including:    Disposition:
HPI:  41 year old male with pertinent medical history of Hypertension and Diabetes Mellitus presents to the ED with acute onset of weakness. He was in normal state of health watching TV when around 430 am in the morning he had symptoms of right hand weakness. It was associated with numbness and tingling in the same hand. He decided to get up and found his right leg felt slightly weak and unstable. He started exercising and did some squats to find his right side more unsteady than the right. He took his Blood pressure and found out that it was high (above 170/125) and his fingerstick was high in 300s. He denies any headache, dizziness, Nausea or Vomiting and any visual changes.  In ED stroke code was called, NIHSS is 1, patient has right sided dysmetria. (31 Mar 2019 11:48)    Neuro Follow-Up:  No acute changes overnight, feeling a bit better today    Vital Signs Last 24 Hrs  T(C): 35.9 (02 Apr 2019 05:59), Max: 37 (01 Apr 2019 17:56)  T(F): 96.6 (02 Apr 2019 05:59), Max: 98.6 (01 Apr 2019 17:56)  HR: 58 (02 Apr 2019 05:59) (56 - 83)  BP: 132/74 (02 Apr 2019 05:59) (116/70 - 138/85)  RR: 17 (02 Apr 2019 05:59) (16 - 18)    NIHSS:     LOC 0  Gaze 0  Facial 0  Visual 0  Motor Arm 0  Motor Leg 0  Sensory 0  Ataxia 1 right  Language 0  Dysarthria 0  Extinction 0  Total 1    Allergies    No Known Allergies    Intolerances      MEDICATIONS  (STANDING):  aspirin  chewable 81 milliGRAM(s) Oral daily  atorvastatin 80 milliGRAM(s) Oral at bedtime  dextrose 5%. 1000 milliLiter(s) (50 mL/Hr) IV Continuous <Continuous>  dextrose 50% Injectable 12.5 Gram(s) IV Push once  heparin  Injectable 5000 Unit(s) SubCutaneous every 8 hours  insulin glargine Injectable (LANTUS) 9 Unit(s) SubCutaneous at bedtime  insulin lispro (HumaLOG) corrective regimen sliding scale   SubCutaneous three times a day before meals  insulin lispro Injectable (HumaLOG) 3 Unit(s) SubCutaneous three times a day before meals  metoprolol tartrate 50 milliGRAM(s) Oral two times a day  sodium chloride 0.9%. 1000 milliLiter(s) (75 mL/Hr) IV Continuous <Continuous>    MEDICATIONS  (PRN):  dextrose 40% Gel 15 Gram(s) Oral once PRN Blood Glucose LESS THAN 70 milliGRAM(s)/deciliter  glucagon  Injectable 1 milliGRAM(s) IntraMuscular once PRN Glucose LESS THAN 70 milligrams/deciliter      LABS:                        13.3   6.44  )-----------( 197      ( 02 Apr 2019 05:10 )             39.9     04-02    137  |  101  |  14  ----------------------------<  112<H>  4.6   |  23  |  1.0    Ca    9.2      02 Apr 2019 05:10  Mg     2.1     04-02    TPro  7.5  /  Alb  4.0  /  TBili  0.4  /  DBili  <0.2  /  AST  23  /  ALT  29  /  AlkPhos  70  04-01    PT/INR - ( 02 Apr 2019 05:10 )   PT: 12.00 sec;   INR: 1.04 ratio         PTT - ( 02 Apr 2019 05:10 )  PTT:32.5 sec  Hemoglobin A1C, Whole Blood: 9.2 % (04-01 @ 04:44)    Neuro Imaging:  < from: MR Head No Cont (03.31.19 @ 15:19) >      1.  Small focus of T2/FLAIR/diffusion hyperintense signal in the left   posterior frontal periventricular white matter consistent with an acute   infarct.    2.  Chronic infarctinvolving the frontal white matter and genu of the   corpus callosum with focal atrophy of the genu of the corpus callosum.    < end of copied text >    < from: CT Angio Neck w/ IV Cont (03.31.19 @ 09:50) >    1.  Unremarkable CT angiogram of the head and neck.    2.  Incidentally noted retropharyngeal course of the right internal   carotid artery, normal variant.     < end of copied text >    < from: Transthoracic Echocardiogram (03.31.19 @ 16:12) >   1. Normal global left ventricular systolic function.   2. Moderate to severe left atrial enlargement.   3. LV Ejection Fraction by Holm's Methodwith a biplane EF of 66 %.   4. Elevated left atrial and left ventricular end-diastolic pressures.   5. Mildly increased LV wall thickness.   6. Normal left ventricular internal cavity size.   7. Spectral Doppler shows impaired relaxation pattern of left   ventricular myocardial filling (Grade I diastolic dysfunction).   8. There is mild concentric left ventricular hypertrophy.   9. The mean global longitudinal strain by speckle tracking is -16.3%   whcih is borderline.  10. There is a torn teritary chord to the anterior mitral leaflet. There   is also an elongated chord to the anterior mitral leaflet.  11. Mild tricuspid regurgitation.  12. Pulmonic valve regurgitation.  13. LA volume Index is 46.0 ml/m² ml/m2.    < end of copied text >
I have personally seen and examined the patient.  I agree with the history and physical which I have reviewed and noted any changes below.  ELECTRONICALLY SIGNED :     04-03-19 @ 09:32    TRANSESOPHAGEAL ECHOCARDIOGRAM     After risks and benefits of procedures were explained, informed consent was obtained and placed in chart.   The patient received topical anesthestic to the oropharynx with viscous lidocaine and benzocaine spray.  Refer to Anesthesia note for sedation details.  The ZAY probe was passed into the esophagus without difficulty.  Transesophageal and transgastric images were obtained.  The ZAY probe was removed without difficulty and examined.  There was no evidence for bleeding.  The patient tolerated the procedure well without any immediate ZAY-related complications.      Preliminary Findings:  No cardiac mass, vegetations, thrombus or shunts visualized.   No spontaneous echo contrast or thrombus in the LA/PERLA/RA/RAA.      Overall LV systolic function was normal, LVH note   Calli Valve: [place] mitral valve with one hypertrophic papillary muscle, rupture chordae tendinea noted, thickened leaflets, mild to moderate regurgitation. No mass or vegatation observed.   Aortic Valve: the valve was trileaflet. There was no evidence for stenosis. There was no evidence for regurgitaiton. no mass or vegeation observed  Tricuspid Valve: leaflets exhibited normal thickness and separation. There was no evidence for stenosis. mild TR  IAS: no evidence for PFO. no R-->L Shunt.     RV systolic function was   normal   No pericardial effusion.   no thoracic  aortic atheroma noted           Final report to follow.
Pt seen and examined with neurologist. No new complaints. Feeling better. Exam now nonfocal. ZAY negative for thombus or shunt. Outpt event monitor.    Gen:NAD, AAOx3  CV: S1 S2  Resp: Decreased BS b/l  GI: NT/ND/S +BS  MS: neg c/c/e  Neuro: nonfocal    Discharge instructions discussed and patient/spouse know when to seek immediate medical attention.  Patient has proper follow up.  All results discussed and patient aware they may require further work up.  Stressed importance of proper follow up.  Medications prescribed and changes discussed.  All questions and concerns from patient and family addressed. Understanding of instructions verbalized.    Time spent in completing discharge process and coordinating care 45 minutes.

## 2019-04-03 NOTE — DISCHARGE NOTE PROVIDER - PROVIDER TOKENS
PROVIDER:[TOKEN:[[id number]],FOLLOWUP:[1 week]],PROVIDER:[TOKEN:[[id number]],FOLLOWUP:[1 week]]

## 2019-04-03 NOTE — PRE-ANESTHESIA EVALUATION ADULT - NSANTHOSAYNRD_GEN_A_CORE
No. ROBERTO CARLOS screening performed.  STOP BANG Legend: 0-2 = LOW Risk; 3-4 = INTERMEDIATE Risk; 5-8 = HIGH Risk

## 2019-04-03 NOTE — DISCHARGE NOTE PROVIDER - NSDCCPCAREPLAN_GEN_ALL_CORE_FT
PRINCIPAL DISCHARGE DIAGNOSIS  Diagnosis: Ischemic stroke of frontal lobe  Assessment and Plan of Treatment: Please continue to take medication as prescribed. Arrange follow-up with your neurologist (Dr. Waggoner) and primary care provider for within one week of discharge. Arrange follow-up with your cardiologist (Dr. Zapien) for event monitor for within two weeks of discharge. TTE/ZAY performed on this admission showed moderate-severe left atrialenlargement, torn tertiary chord to anterior mitral leaflet, pulmonic valve regurgitation,a nd mild tricuspid regurgitation. MRI of the head showed small focus consistent with acute infarct in posterior frontal periventricular white matters and chronic infarct involving frontal white matter and genu of the corpus callosum. If you experience any worrying symptoms such syncopal episodes, falls, intractable headaches, or intractable vomitting, please contact emergency personnel as soon as possible.      SECONDARY DISCHARGE DIAGNOSES  Diagnosis: Diabetes  Assessment and Plan of Treatment: Please continue to take medication as prescribed. Arrange follow-up with your primary care provider for within one week of discharge. On this admission, your hgb a1c was 9.2 on this admission which is very elevated. It is very important to control your blood sugars as continued elevation in blood sugars is associated with stroke, vascular, and other conditions that can markedly reduce quality of life and increases mortality risk. PRINCIPAL DISCHARGE DIAGNOSIS  Diagnosis: Ischemic stroke of frontal lobe  Assessment and Plan of Treatment: Please continue to take medication as prescribed. Arrange follow-up with your neurologist (Dr. Waggoner) and primary care provider for within one week of discharge. Arrange follow-up with your cardiologist (Dr. Zapien) for event monitor for within two weeks of discharge. TTE/ZAY performed on this admission showed moderate-severe left atrialenlargement, torn tertiary chord to anterior mitral leaflet, pulmonic valve regurgitation,a nd mild tricuspid regurgitation. MRI of the head showed small focus consistent with acute infarct in posterior frontal periventricular white matters and chronic infarct involving frontal white matter and genu of the corpus callosum. If you experience any worrying symptoms such syncopal episodes, falls, intractable headaches, or intractable vomitting, please contact emergency personnel as soon as possible.      SECONDARY DISCHARGE DIAGNOSES  Diagnosis: Diabetes  Assessment and Plan of Treatment: Please continue to take medication as prescribed. Added Actos to your regimen. Monitor sugars at home. Arrange follow-up with your primary care provider for within one week of discharge. On this admission, your hgb a1c was 9.2 on this admission which is very elevated. It is very important to control your blood sugars as continued elevation in blood sugars is associated with stroke, vascular, and other conditions that can markedly reduce quality of life and increases mortality risk.

## 2019-04-03 NOTE — PROGRESS NOTE ADULT - ASSESSMENT
42 y/o male with acute left frontal lobe infarct, ZAY is unremarkable for possible course for stroke    Plan:  D/C today  ASA 81 mg QD  Lipitor 80 mg QD  f/u hypercoag labs  Diabetic education  Outpatient Stroke Clinic f/u 1 week      Neuroattending note will follow

## 2019-04-03 NOTE — DISCHARGE NOTE PROVIDER - CARE PROVIDER_API CALL
Jose Jeffrey)  EEGEpilepsy; Neurology  [address] [facility], [address]  [place] [address]  Phone: [phone]  Fax: [phone]  Follow Up Time: 1 week    Gautam Zapien)  Cardiology; Interventional Cardiology  [address] [place] [address]  Phone: [phone]  Fax: [phone]  Follow Up Time: 1 week

## 2019-04-03 NOTE — DISCHARGE NOTE NURSING/CASE MANAGEMENT/SOCIAL WORK - NSDCDPATPORTLINK_GEN_ALL_CORE
You can access the [facility] Patient Portal, offered by [facility], by registering with the following website: [email or web address]

## 2019-04-03 NOTE — DISCHARGE NOTE NURSING/CASE MANAGEMENT/SOCIAL WORK - NSDCPEPTSTRK_GEN_ALL_CORE
Need for follow up after discharge/Risk factors for stroke/Stroke warning signs and symptoms/Signs and symptoms of stroke/Call 911 for stroke/Prescribed medications/Stroke support groups for patients, families, and friends/Stroke education booklet

## 2019-04-03 NOTE — PROGRESS NOTE ADULT - ATTENDING COMMENTS
Patient seen and examined agree with above except as above.  Patient is stable     Plan as as above
Patient seen and examined independently earlier today. Case discussed with housestaff, nursing, social work, patient, neuro, cardio. I agree with most of the resident's note, physical exam, and plan except as below. Patient feels better. No new complaints. On exam: Rt prox LE 5-/5, rest 5/5. Awaiting ZAY. No events on tele. Cont ASA, statin, counselled pt on DM. F/u hypercoag workup.      #Progress Note Handoff  Pending (specify):  ZAY  Pt/Family discussion: Pt informed and agrees with the current plan  Disposition: Home with outpt PT
Patient seen and examined and agree with above except as noted.  Case discussed with medical team on rounds  Patient feels slightly better but still feels his walking is not normal  Right leg has abnormal HTS  power 5/5  NIHSS 1    MRI brain prelim left corona radiata infarct    Plan as above
Patient seen and examined with PA.  Patient has difficulty ambulating but NIHSS 0  MRI reviewed and shows left pvwm acute infarct  Hgba1c elevated  Awaiting ZAY today    Plan as above

## 2019-04-03 NOTE — DISCHARGE NOTE PROVIDER - HOSPITAL COURSE
41 year old male with pertinent medical history of Hypertension and Diabetes Mellitus presented to the ED with acute onset of weakness.         Ischemic Stroke; possibly Transient    -Negative CTA head and neck and CT head shows chronic microvascular changes    -Echo w/ EF 66%, moderate-severe left atrial enlargement, GIDD, torn tertiary chord to anterior mitral leaflet, pulmonic valve regurgitation, mild TR    -MR Head showing small focus consistent with acute infarct in posterior frontal periventricular white matters, chronic infarct involving frontal white matter and genu of the corpus callosum with focal atrophy of the genu of the corpus callosum    -NIHSS is 0 on discharge    -c/w aspirin and atorvastatin    -ZAY preliminarily unremarkable    -Rehab: Outpatient w/ VNS        Hypertension    - d/c on home meds        Diabetes Mellitus    - d/c on home meds        Pt discharge with close follow-up. Pt agrees with discharge plan. 41 year old male with pertinent medical history of Hypertension and Diabetes Mellitus presented to the ED with acute onset of weakness.         Ischemic Stroke; possibly Transient    -Negative CTA head and neck and CT head shows chronic microvascular changes    -Echo w/ EF 66%, moderate-severe left atrial enlargement, GIDD, torn tertiary chord to anterior mitral leaflet, pulmonic valve regurgitation, mild TR    -MR Head showing small focus consistent with acute infarct in posterior frontal periventricular white matters, chronic infarct involving frontal white matter and genu of the corpus callosum with focal atrophy of the genu of the corpus callosum    -NIHSS is 0 on discharge    -c/w aspirin and atorvastatin    -ZAY preliminarily unremarkable    -Rehab: Outpatient     -outpt event monitor        Hypertension    - d/c on home meds        Diabetes Mellitus    - d/c on home meds        Pt discharge with close follow-up. Pt agrees with discharge plan.

## 2019-04-04 LAB
APCR PPP: 2.99 RATIO — SIGNIFICANT CHANGE UP
PROT C ACT/NOR PPP: 102 % — SIGNIFICANT CHANGE UP (ref 65–129)

## 2019-04-05 PROBLEM — I10 ESSENTIAL (PRIMARY) HYPERTENSION: Chronic | Status: ACTIVE | Noted: 2019-03-31

## 2019-04-05 PROBLEM — E11.9 TYPE 2 DIABETES MELLITUS WITHOUT COMPLICATIONS: Chronic | Status: ACTIVE | Noted: 2019-03-31

## 2019-04-05 LAB — DNA PLOIDY SPEC FC-IMP: SIGNIFICANT CHANGE UP

## 2019-04-06 LAB — PROT S FREE PPP-ACNC: 96 % NORMAL — SIGNIFICANT CHANGE UP (ref 70–150)

## 2019-04-09 DIAGNOSIS — I34.0 NONRHEUMATIC MITRAL (VALVE) INSUFFICIENCY: ICD-10-CM

## 2019-04-09 DIAGNOSIS — R27.8 OTHER LACK OF COORDINATION: ICD-10-CM

## 2019-04-09 DIAGNOSIS — E11.9 TYPE 2 DIABETES MELLITUS WITHOUT COMPLICATIONS: ICD-10-CM

## 2019-04-09 DIAGNOSIS — R20.0 ANESTHESIA OF SKIN: ICD-10-CM

## 2019-04-09 DIAGNOSIS — I63.89 OTHER CEREBRAL INFARCTION: ICD-10-CM

## 2019-04-09 DIAGNOSIS — I10 ESSENTIAL (PRIMARY) HYPERTENSION: ICD-10-CM

## 2019-04-09 DIAGNOSIS — Z79.84 LONG TERM (CURRENT) USE OF ORAL HYPOGLYCEMIC DRUGS: ICD-10-CM

## 2019-04-09 DIAGNOSIS — R27.0 ATAXIA, UNSPECIFIED: ICD-10-CM

## 2019-04-09 LAB — PTR INTERPRETATION: SIGNIFICANT CHANGE UP

## 2019-04-23 PROBLEM — Z00.00 ENCOUNTER FOR PREVENTIVE HEALTH EXAMINATION: Status: ACTIVE | Noted: 2019-04-23

## 2019-05-03 ENCOUNTER — APPOINTMENT (OUTPATIENT)
Dept: NEUROLOGY | Facility: CLINIC | Age: 44
End: 2019-05-03
Payer: COMMERCIAL

## 2019-05-03 PROCEDURE — 99214 OFFICE O/P EST MOD 30 MIN: CPT

## 2019-06-22 ENCOUNTER — OUTPATIENT (OUTPATIENT)
Dept: OUTPATIENT SERVICES | Facility: HOSPITAL | Age: 44
LOS: 1 days | Discharge: HOME | End: 2019-06-22

## 2019-06-22 DIAGNOSIS — I67.82 CEREBRAL ISCHEMIA: ICD-10-CM

## 2019-06-22 DIAGNOSIS — I10 ESSENTIAL (PRIMARY) HYPERTENSION: ICD-10-CM

## 2019-06-22 DIAGNOSIS — E11.65 TYPE 2 DIABETES MELLITUS WITH HYPERGLYCEMIA: ICD-10-CM

## 2019-06-22 DIAGNOSIS — Z00.01 ENCOUNTER FOR GENERAL ADULT MEDICAL EXAMINATION WITH ABNORMAL FINDINGS: ICD-10-CM

## 2019-06-22 DIAGNOSIS — E53.9 VITAMIN B DEFICIENCY, UNSPECIFIED: ICD-10-CM

## 2019-06-22 DIAGNOSIS — E55.9 VITAMIN D DEFICIENCY, UNSPECIFIED: ICD-10-CM

## 2019-06-22 DIAGNOSIS — E78.5 HYPERLIPIDEMIA, UNSPECIFIED: ICD-10-CM

## 2019-11-13 ENCOUNTER — APPOINTMENT (OUTPATIENT)
Dept: NEUROLOGY | Facility: CLINIC | Age: 44
End: 2019-11-13
Payer: COMMERCIAL

## 2019-11-13 VITALS
DIASTOLIC BLOOD PRESSURE: 88 MMHG | SYSTOLIC BLOOD PRESSURE: 162 MMHG | WEIGHT: 200 LBS | HEIGHT: 66 IN | HEART RATE: 69 BPM | BODY MASS INDEX: 32.14 KG/M2

## 2019-11-13 DIAGNOSIS — Z86.39 PERSONAL HISTORY OF OTHER ENDOCRINE, NUTRITIONAL AND METABOLIC DISEASE: ICD-10-CM

## 2019-11-13 PROCEDURE — 99214 OFFICE O/P EST MOD 30 MIN: CPT

## 2019-11-13 RX ORDER — ATORVASTATIN CALCIUM 80 MG/1
80 TABLET, FILM COATED ORAL DAILY
Refills: 0 | Status: ACTIVE | COMMUNITY

## 2019-11-13 RX ORDER — LISINOPRIL 40 MG/1
40 TABLET ORAL DAILY
Refills: 0 | Status: ACTIVE | COMMUNITY

## 2019-11-13 RX ORDER — ASPIRIN 81 MG
81 TABLET, DELAYED RELEASE (ENTERIC COATED) ORAL DAILY
Refills: 0 | Status: ACTIVE | COMMUNITY

## 2019-11-13 RX ORDER — AMLODIPINE BESYLATE 10 MG/1
10 TABLET ORAL DAILY
Refills: 0 | Status: ACTIVE | COMMUNITY

## 2019-11-13 RX ORDER — SITAGLIPTIN AND METFORMIN HYDROCHLORIDE 50; 1000 MG/1; MG/1
50-1000 TABLET, FILM COATED ORAL DAILY
Refills: 0 | Status: ACTIVE | COMMUNITY

## 2019-11-13 RX ORDER — PIOGLITAZONE HYDROCHLORIDE 30 MG/1
30 TABLET ORAL DAILY
Refills: 0 | Status: ACTIVE | COMMUNITY

## 2019-11-13 NOTE — HISTORY OF PRESENT ILLNESS
[FreeTextEntry1] : Mr. Prescott is a 43-year-old man seen by me at [facility] at the end of March and April 2019 with past medical history of hypertension, diabetes, presented to the emergency room with acute onset of weakness in the right mostly leg found to have a stroke in the left postfrontal periventricular white matter.  He followed up with me in May 3rd 2019 and his biggest risk factor was his diabetes with a hgba1c of 9.2.  He has modified his diet and believes his glucose has been better controlled and is waiting for the results of recent blood test.  He stopped his PT 2 weeks ago and does the exercises at home now on his own.\par No new complaints and still notices some difficulty on the right when he tries to move quickly.

## 2019-11-13 NOTE — PHYSICAL EXAM
[FreeTextEntry1] : The patient is alert and oriented to person, place and time.  Language and attention are normal.  \par Cranial nerves, cranial nerves II through XII are normal.  \par Motor, power is 5/5 in all extremities. \par Sensory, pinprick, temperature, and vibration are normal and symmetric throughout. \par Deep tendon reflexes, reflexes are 1+ in left upper extremity, trace right upper extremity, absent bilateral lower extremities.  \par Cerebellar, finger-to-nose is normal.  Heel to shin appears to be normal.  Rapid alternating movements are normal.  Foot tapping appears to be fairly symmetric.  \par Gait, gait is slightly off. \par \par NIHSS 0\par mrankin 0\par

## 2019-11-13 NOTE — DISCUSSION/SUMMARY
[FreeTextEntry1] : Mr. Prescott is a 44-year-old with multiple vascular risk factors with poor diabetes control with A1c of 9.2 and slightly elevated LDL of 100.  At this point, he needs to do aggressive medical management to reduce his risk for another stroke.  Location of the infarct is likely a small vessel and less likely to be embolic.  \par 1. Continue aspirin and statin \par 2. Counseled on importance of glycemic control and cholesterol\par 3. Continue PT/OT\par 4. f/u in 6-9 months

## 2020-02-21 ENCOUNTER — TRANSCRIPTION ENCOUNTER (OUTPATIENT)
Age: 45
End: 2020-02-21

## 2020-05-18 ENCOUNTER — APPOINTMENT (OUTPATIENT)
Dept: NEUROLOGY | Facility: CLINIC | Age: 45
End: 2020-05-18
Payer: COMMERCIAL

## 2020-05-18 PROCEDURE — 99213 OFFICE O/P EST LOW 20 MIN: CPT | Mod: 95

## 2020-05-18 NOTE — DISCUSSION/SUMMARY
[FreeTextEntry1] : Mr. Prescott is a 45yo man with stroke a year ago likely due to poorly controlled diabetes.  He has been doing well and has no new complaints\par 1. Continue aspirin 81mg QD and statin\par 2. Continue to monitor glucose control\par 3. Continue exercise and diet\par 4. Follow up in one year

## 2020-05-18 NOTE — PHYSICAL EXAM
[FreeTextEntry1] : a+Ox3 language and attention normal\par No facial, tongue midline, EOMI\par No drift\par Gait normal\par FTN  NL\par \par NIHSS 0\par mrankin 0\par

## 2020-05-18 NOTE — HISTORY OF PRESENT ILLNESS
[FreeTextEntry1] : Patients visit was initiated using [employer] however patient was unable to get a reliable cell phone signal so the visit was converted to a [employer] video call and consent for the visit and technology was obtained for patient.\par \par Mr. Prescott is a 45yo man with stroke last year causing weakness in his right leg.  His risk factor was poorly controlled diabetes with hgba1c of 9.1\par He has been doing well with his glucose control and he has been running again.  He still sometimes notices his legs get tired and his walking will get more difficult but this has become less.\par He is still on aspirin and statin and has appt with his PMD this week to for a check up\par

## 2020-10-19 ENCOUNTER — APPOINTMENT (OUTPATIENT)
Dept: NEUROLOGY | Facility: CLINIC | Age: 45
End: 2020-10-19
Payer: COMMERCIAL

## 2020-10-19 VITALS
HEART RATE: 67 BPM | SYSTOLIC BLOOD PRESSURE: 175 MMHG | OXYGEN SATURATION: 100 % | WEIGHT: 195 LBS | DIASTOLIC BLOOD PRESSURE: 98 MMHG | HEIGHT: 66 IN | BODY MASS INDEX: 31.34 KG/M2 | TEMPERATURE: 97.3 F

## 2020-10-19 PROCEDURE — 99213 OFFICE O/P EST LOW 20 MIN: CPT

## 2020-10-19 PROCEDURE — 99072 ADDL SUPL MATRL&STAF TM PHE: CPT

## 2020-10-19 RX ORDER — NIFEDIPINE 60 MG/1
60 TABLET, FILM COATED, EXTENDED RELEASE ORAL
Qty: 30 | Refills: 0 | Status: ACTIVE | COMMUNITY
Start: 2020-05-21

## 2020-10-19 RX ORDER — NIFEDIPINE 90 MG/1
90 TABLET, EXTENDED RELEASE ORAL
Qty: 30 | Refills: 0 | Status: ACTIVE | COMMUNITY
Start: 2020-04-08

## 2020-10-19 RX ORDER — NEBIVOLOL HYDROCHLORIDE 10 MG/1
10 TABLET ORAL
Qty: 30 | Refills: 0 | Status: ACTIVE | COMMUNITY
Start: 2020-08-21

## 2020-10-19 NOTE — HISTORY OF PRESENT ILLNESS
[FreeTextEntry1] : Devon is a 46yo man here for follow up of his stroke.  Last seen using telehealth on 5/18/2020.  He has been doing well and his hgba1c is now around 8 but he is having issues with janumet and sexual dysfunction.l  He has maintained healthy diet and cut most sugar and carbs form his diet but still cannot get his sugars under control.  He notices issues with his leg when he is tired.

## 2020-10-19 NOTE — DISCUSSION/SUMMARY
[Antithrombotic therapy with ___] : antithrombotic therapy with  [unfilled] [Goals and Counseling] : I have reviewed the goals of stroke risk factor modification. I counseled the patient on measures to reduce stroke risk, including the importance of medication compliance, risk factor control, exercise, healthy diet and avoidance of smoking. I reviewed stroke warning signs and symptoms and appropriate actions to take if such occur. [Patient encouraged to discuss with Primary MD] : I encouraged the patient to discuss these important issues with ~his/her~ primary care doctor [FreeTextEntry1] : Mr. Prescott is a 46yo man with stroke a year ago likely due to poorly controlled diabetes. He has been doing well and has no new complaints\par 1. Continue aspirin 81mg QD and statin\par 2. Continue to monitor glucose control\par 3. Diet and exercise\par \par

## 2020-10-19 NOTE — PHYSICAL EXAM
[FreeTextEntry1] : a+Ox3 language and attention normal\par No facial, tongue midline, EOMI\par No drift, power 5/5 \par Temp, Vib symmetric\par Gait normal\par FTN NL\par \par NIHSS 0\par mrankin 0

## 2021-08-24 ENCOUNTER — APPOINTMENT (OUTPATIENT)
Dept: NEUROLOGY | Facility: CLINIC | Age: 46
End: 2021-08-24
Payer: COMMERCIAL

## 2021-08-24 VITALS
DIASTOLIC BLOOD PRESSURE: 104 MMHG | TEMPERATURE: 97.8 F | HEIGHT: 66 IN | HEART RATE: 67 BPM | BODY MASS INDEX: 30.86 KG/M2 | WEIGHT: 192 LBS | SYSTOLIC BLOOD PRESSURE: 172 MMHG

## 2021-08-24 DIAGNOSIS — I63.9 CEREBRAL INFARCTION, UNSPECIFIED: ICD-10-CM

## 2021-08-24 DIAGNOSIS — Z86.79 PERSONAL HISTORY OF OTHER DISEASES OF THE CIRCULATORY SYSTEM: ICD-10-CM

## 2021-08-24 PROCEDURE — 99072 ADDL SUPL MATRL&STAF TM PHE: CPT

## 2021-08-24 PROCEDURE — 99213 OFFICE O/P EST LOW 20 MIN: CPT

## 2021-08-24 NOTE — DISCUSSION/SUMMARY
[FreeTextEntry1] : Mr. Prescott is a 46yo man with stroke two years ago likely due to poorly controlled diabetes. He has been doing well and has no new complaints however his blood pressure and glucose still are not optimally controlled.\par 1. f/u with PMD for better BP and glycemic control\par 2. Continue aspirin 81mg QD and statin\par 3. Continue to monitor glucose control\par 4. Diet and exercise\par 5. Adequate hydration\par 6. Call for any changes\par 7. f/u in 6-9 months\par \par \par

## 2021-08-24 NOTE — HISTORY OF PRESENT ILLNESS
[FreeTextEntry1] : Devon is a 44yo man here for follow up of his stroke.  Last seen using telehealth on 10/19/2020.  He has been doing well and his hgba1c is lower but he is not sure of the value. He has maintained healthy diet and cut most sugar and carbs form his diet but still cannot get his sugars under control.  His BP has also been elevated and he believes it is because he had a Gyro yesterday.\par NO medication changes and no new toxic habits

## 2022-03-04 ENCOUNTER — LABORATORY RESULT (OUTPATIENT)
Age: 47
End: 2022-03-04

## 2022-03-04 ENCOUNTER — APPOINTMENT (OUTPATIENT)
Dept: HEMATOLOGY ONCOLOGY | Facility: CLINIC | Age: 47
End: 2022-03-04
Payer: COMMERCIAL

## 2022-03-04 ENCOUNTER — OUTPATIENT (OUTPATIENT)
Dept: OUTPATIENT SERVICES | Facility: HOSPITAL | Age: 47
LOS: 1 days | Discharge: HOME | End: 2022-03-04

## 2022-03-04 VITALS
WEIGHT: 193 LBS | SYSTOLIC BLOOD PRESSURE: 157 MMHG | DIASTOLIC BLOOD PRESSURE: 96 MMHG | HEART RATE: 71 BPM | RESPIRATION RATE: 14 BRPM | TEMPERATURE: 98.4 F | BODY MASS INDEX: 31.02 KG/M2 | HEIGHT: 66 IN

## 2022-03-04 PROCEDURE — 99203 OFFICE O/P NEW LOW 30 MIN: CPT

## 2022-03-05 LAB
ALBUMIN SERPL ELPH-MCNC: 4.5 G/DL
ALP BLD-CCNC: 91 U/L
ALT SERPL-CCNC: 32 U/L
ANION GAP SERPL CALC-SCNC: 9 MMOL/L
AST SERPL-CCNC: 32 U/L
BILIRUB SERPL-MCNC: 0.4 MG/DL
BUN SERPL-MCNC: 12 MG/DL
CALCIUM SERPL-MCNC: 9.6 MG/DL
CEA SERPL-MCNC: 2.4 NG/ML
CHLORIDE SERPL-SCNC: 101 MMOL/L
CO2 SERPL-SCNC: 28 MMOL/L
CREAT SERPL-MCNC: 1 MG/DL
EGFR: 93 ML/MIN/1.73M2
FERRITIN SERPL-MCNC: 16 NG/ML
GLUCOSE SERPL-MCNC: 89 MG/DL
HCT VFR BLD CALC: 37.2 %
HGB BLD-MCNC: 12 G/DL
MCHC RBC-ENTMCNC: 24.1 PG
MCHC RBC-ENTMCNC: 32.3 G/DL
MCV RBC AUTO: 74.8 FL
PLATELET # BLD AUTO: 261 K/UL
PMV BLD: 9.6 FL
POTASSIUM SERPL-SCNC: 4.6 MMOL/L
PROT SERPL-MCNC: 8 G/DL
RBC # BLD: 4.97 M/UL
RBC # FLD: 14.6 %
SODIUM SERPL-SCNC: 138 MMOL/L
WBC # FLD AUTO: 4.35 K/UL

## 2022-03-07 ENCOUNTER — RESULT REVIEW (OUTPATIENT)
Age: 47
End: 2022-03-07

## 2022-03-07 ENCOUNTER — APPOINTMENT (OUTPATIENT)
Dept: SURGERY | Facility: CLINIC | Age: 47
End: 2022-03-07
Payer: COMMERCIAL

## 2022-03-07 VITALS
SYSTOLIC BLOOD PRESSURE: 131 MMHG | HEIGHT: 66 IN | DIASTOLIC BLOOD PRESSURE: 81 MMHG | OXYGEN SATURATION: 97 % | WEIGHT: 190 LBS | HEART RATE: 97 BPM | TEMPERATURE: 97 F | BODY MASS INDEX: 30.53 KG/M2

## 2022-03-07 DIAGNOSIS — D50.9 IRON DEFICIENCY ANEMIA, UNSPECIFIED: ICD-10-CM

## 2022-03-07 PROCEDURE — 99204 OFFICE O/P NEW MOD 45 MIN: CPT

## 2022-03-08 ENCOUNTER — NON-APPOINTMENT (OUTPATIENT)
Age: 47
End: 2022-03-08

## 2022-03-09 DIAGNOSIS — C18.9 MALIGNANT NEOPLASM OF COLON, UNSPECIFIED: ICD-10-CM

## 2022-03-09 NOTE — HISTORY OF PRESENT ILLNESS
[de-identified] : 46 year old black male referred by Dr Manuel for newly diagnosed rectosigmoid cancer . PMH is significant for hypertension , diabetes mellitus , ischemic stroke , For the past year he has complained of loose stools and  intermittent  bleeding  after BM and mixed with stools . He is on several medications including ASA . Colonoscopy showed \par He denies weight loss , abdominal pain , nausea , vomiting , constipation or weight loss .\par he had an episode of right sided weakness and numbness one year ago , resolved without deficits . \par FH : negative .

## 2022-03-09 NOTE — ASSESSMENT
[FreeTextEntry1] : 46 year old male with newly diagnosed non-obstructing rectosigmoid cancer with mild bleeding . \par Plan :  work up , prognosis and treatment of colorectal cancer was discussed with emphasis on complete staging including CT chest abdomen and pelvis , blood work with CBC , CMP , CEA and ferritin , treatment of iron deficiency . \par MRI if deemed necessary by surgery .\par In the absence of distant metastasis , he will require low anterior resection , Post operative adjuvant  treatment based on clinicopathologic stage and prognostic features . \par

## 2022-03-14 NOTE — PHYSICAL EXAM
[Abdomen Masses] : No abdominal masses [Abdomen Tenderness] : ~T No ~M abdominal tenderness [No HSM] : no hepatosplenomegaly [Respiratory Effort] : normal respiratory effort [Normal Rate and Rhythm] : normal rate and rhythm [de-identified] : awake, alert and in no acute distress

## 2022-03-14 NOTE — ASSESSMENT
[FreeTextEntry1] : 46M with rectosigmoid mass concerning for malignancy\par \par I had a long discussion with the patient regarding his diagnosis.  I discussed that we will treat the mass as a malignancy.  We will proceed with clinical staging.  We will schedule the patient for CT chest abdomen and pelvis and obtain lab work including a CEA level.  We will have the patient return in 1-2 weeks.  We will perform rigid proctoscopy on the next visit.

## 2022-03-14 NOTE — HISTORY OF PRESENT ILLNESS
[FreeTextEntry1] : Patient is a 46M with PMH of HTN, HLD, DM, TIA 3yrs ago who presents for evaluation of rectosigmoid mass.  Patient underwent colonoscopy with Dr. Manuel in 2/2022 that showed a rectosigmoid mass.  Path showed TVA with HGD. Patient reports mild blood with BM.  Patient denies fevers, chills, nausea, vomiting, abdominal pain, constipation, diarrhea or unexpected weight loss.  Patient denies a family history of colon cancer rectal cancer or inflammatory bowel disease.  
None known

## 2022-03-14 NOTE — CONSULT LETTER
[Dear  ___] : Dear  [unfilled], [Consult Letter:] : I had the pleasure of evaluating your patient, [unfilled]. [Please see my note below.] : Please see my note below. [Consult Closing:] : Thank you very much for allowing me to participate in the care of this patient.  If you have any questions, please do not hesitate to contact me. [FreeTextEntry3] : Sincerely,\par \par Lucian Dill MD, Colon and Rectal Surgery\par \par [employer] [facility]\par [address] [place] [address]\par [facility], 3rd Floor\par [place], [place] [address]\par Tel [phone] ext [phone]\par Fax [phone]\par  [DrParag  ___] : Dr. GARCIA

## 2022-03-18 ENCOUNTER — OUTPATIENT (OUTPATIENT)
Dept: OUTPATIENT SERVICES | Facility: HOSPITAL | Age: 47
LOS: 1 days | Discharge: HOME | End: 2022-03-18
Payer: COMMERCIAL

## 2022-03-18 ENCOUNTER — RESULT REVIEW (OUTPATIENT)
Age: 47
End: 2022-03-18

## 2022-03-18 DIAGNOSIS — R10.9 UNSPECIFIED ABDOMINAL PAIN: ICD-10-CM

## 2022-03-18 DIAGNOSIS — C18.9 MALIGNANT NEOPLASM OF COLON, UNSPECIFIED: ICD-10-CM

## 2022-03-18 DIAGNOSIS — R10.2 PELVIC AND PERINEAL PAIN: ICD-10-CM

## 2022-03-18 PROCEDURE — 71260 CT THORAX DX C+: CPT | Mod: 26

## 2022-03-18 PROCEDURE — 74177 CT ABD & PELVIS W/CONTRAST: CPT | Mod: 26

## 2022-03-22 ENCOUNTER — TRANSCRIPTION ENCOUNTER (OUTPATIENT)
Age: 47
End: 2022-03-22

## 2022-03-22 ENCOUNTER — APPOINTMENT (OUTPATIENT)
Age: 47
End: 2022-03-22

## 2022-03-25 ENCOUNTER — APPOINTMENT (OUTPATIENT)
Dept: NEUROLOGY | Facility: CLINIC | Age: 47
End: 2022-03-25

## 2022-04-06 ENCOUNTER — APPOINTMENT (OUTPATIENT)
Dept: SURGERY | Facility: CLINIC | Age: 47
End: 2022-04-06
Payer: COMMERCIAL

## 2022-04-06 VITALS
SYSTOLIC BLOOD PRESSURE: 150 MMHG | HEART RATE: 71 BPM | HEIGHT: 66 IN | TEMPERATURE: 96.3 F | WEIGHT: 191 LBS | DIASTOLIC BLOOD PRESSURE: 90 MMHG | BODY MASS INDEX: 30.7 KG/M2 | OXYGEN SATURATION: 98 %

## 2022-04-06 DIAGNOSIS — C19 MALIGNANT NEOPLASM OF RECTOSIGMOID JUNCTION: ICD-10-CM

## 2022-04-06 PROCEDURE — 45300 PROCTOSIGMOIDOSCOPY DX: CPT

## 2022-04-06 NOTE — CONSULT LETTER
[Dear  ___] : Dear  [unfilled], [Courtesy Letter:] : I had the pleasure of seeing your patient, [unfilled], in my office today. [Please see my note below.] : Please see my note below. [Consult Closing:] : Thank you very much for allowing me to participate in the care of this patient.  If you have any questions, please do not hesitate to contact me. [FreeTextEntry3] : Sincerely,\par \par Lucian Dill MD, Colon and Rectal Surgery\par \par [employer] [facility]\par [address] [place] [address]\par [facility], 3rd Floor\par [place], [place] [address]\par Tel [phone] ext [phone]\par Fax [phone]\par

## 2022-04-06 NOTE — PROCEDURE
[FreeTextEntry1] : LUIS A and rigid proctoscopy showed normal sphincter tone, normal internal hemorrhoids.  A circumferential mass was noted at 13cm from the anal verge.\par

## 2022-04-06 NOTE — ASSESSMENT
[FreeTextEntry1] : 46M with rectosigmoid mass concerning for malignancy\par \par I will obtain an pelvic MRI and we will refer the patient to pulmonary for evaluation of the ANIL.  We will plan for robotic LAR.  Timing of surgery is pending pulmonary evaluation.

## 2022-04-06 NOTE — PHYSICAL EXAM
[Abdomen Masses] : No abdominal masses [Abdomen Tenderness] : ~T No ~M abdominal tenderness [No HSM] : no hepatosplenomegaly [Normal rectal exam] : exam was normal [Excoriation] : no perianal excoriation [Fistula] : no fistulas [Wart] : no warts [Ulcer ___ cm] : no ulcers [Pilonidal Cyst] : no pilonidal cysts [Tender, Swollen] : nontender, non-swollen [Thrombosed] : that was not thrombosed [Skin Tags] : there were no residual hemorrhoidal skin tags seen [Normal] : was normal [None] : there was no rectal mass  [Respiratory Effort] : normal respiratory effort [Normal Rate and Rhythm] : normal rate and rhythm [de-identified] : awake, alert and in no acute distress

## 2022-04-06 NOTE — HISTORY OF PRESENT ILLNESS
[FreeTextEntry1] : Patient is a 46M with PMH of HTN, HLD, DM, TIA 3yrs ago who presents for evaluation of rectosigmoid mass.  Patient underwent colonoscopy with Dr. Manuel in 2/2022 that showed a rectosigmoid mass.  Path showed TVA with HGD. Patient reports mild blood with BM.  He was sent for CTCAP that did not show the lesion, no liver masses and large chest ANIL.  CEA was 2.4. Patient denies fevers, chills, nausea, vomiting, abdominal pain, constipation, diarrhea or unexpected weight loss.  Patient denies a family history of colon cancer rectal cancer or inflammatory bowel disease.

## 2022-04-07 ENCOUNTER — NON-APPOINTMENT (OUTPATIENT)
Age: 47
End: 2022-04-07

## 2022-04-08 ENCOUNTER — NON-APPOINTMENT (OUTPATIENT)
Age: 47
End: 2022-04-08

## 2022-04-08 ENCOUNTER — APPOINTMENT (OUTPATIENT)
Age: 47
End: 2022-04-08
Payer: COMMERCIAL

## 2022-04-08 VITALS
WEIGHT: 193 LBS | DIASTOLIC BLOOD PRESSURE: 80 MMHG | RESPIRATION RATE: 14 BRPM | HEART RATE: 70 BPM | HEIGHT: 66 IN | OXYGEN SATURATION: 98 % | SYSTOLIC BLOOD PRESSURE: 122 MMHG | BODY MASS INDEX: 31.02 KG/M2

## 2022-04-08 PROCEDURE — 99203 OFFICE O/P NEW LOW 30 MIN: CPT

## 2022-04-08 NOTE — ASSESSMENT
[FreeTextEntry1] : Mediastinal LN possibly inflammatory.  DOubt metastatic disease\par HO COlon mass likely malignant

## 2022-04-14 ENCOUNTER — NON-APPOINTMENT (OUTPATIENT)
Age: 47
End: 2022-04-14

## 2022-04-15 ENCOUNTER — LABORATORY RESULT (OUTPATIENT)
Age: 47
End: 2022-04-15

## 2022-04-18 ENCOUNTER — OUTPATIENT (OUTPATIENT)
Dept: OUTPATIENT SERVICES | Facility: HOSPITAL | Age: 47
LOS: 1 days | Discharge: HOME | End: 2022-04-18
Payer: COMMERCIAL

## 2022-04-18 ENCOUNTER — RESULT REVIEW (OUTPATIENT)
Age: 47
End: 2022-04-18

## 2022-04-18 ENCOUNTER — TRANSCRIPTION ENCOUNTER (OUTPATIENT)
Age: 47
End: 2022-04-18

## 2022-04-18 VITALS
HEART RATE: 63 BPM | WEIGHT: 190.04 LBS | RESPIRATION RATE: 18 BRPM | TEMPERATURE: 97 F | HEIGHT: 66 IN | SYSTOLIC BLOOD PRESSURE: 161 MMHG | DIASTOLIC BLOOD PRESSURE: 92 MMHG

## 2022-04-18 VITALS
OXYGEN SATURATION: 97 % | DIASTOLIC BLOOD PRESSURE: 84 MMHG | RESPIRATION RATE: 24 BRPM | HEART RATE: 65 BPM | SYSTOLIC BLOOD PRESSURE: 130 MMHG

## 2022-04-18 LAB
GRAM STN FLD: SIGNIFICANT CHANGE UP
SPECIMEN SOURCE: SIGNIFICANT CHANGE UP

## 2022-04-18 PROCEDURE — 88173 CYTOPATH EVAL FNA REPORT: CPT | Mod: 26,59

## 2022-04-18 PROCEDURE — 88305 TISSUE EXAM BY PATHOLOGIST: CPT | Mod: 26

## 2022-04-18 PROCEDURE — 88112 CYTOPATH CELL ENHANCE TECH: CPT | Mod: 26

## 2022-04-18 PROCEDURE — 88341 IMHCHEM/IMCYTCHM EA ADD ANTB: CPT | Mod: 26

## 2022-04-18 PROCEDURE — 31652 BRONCH EBUS SAMPLNG 1/2 NODE: CPT

## 2022-04-18 PROCEDURE — 88312 SPECIAL STAINS GROUP 1: CPT | Mod: 26

## 2022-04-18 PROCEDURE — 88342 IMHCHEM/IMCYTCHM 1ST ANTB: CPT | Mod: 26

## 2022-04-18 RX ORDER — SITAGLIPTIN AND METFORMIN HYDROCHLORIDE 500; 50 MG/1; MG/1
1 TABLET, FILM COATED ORAL
Qty: 0 | Refills: 0 | DISCHARGE

## 2022-04-18 RX ORDER — GLIMEPIRIDE 1 MG
1 TABLET ORAL
Qty: 0 | Refills: 0 | DISCHARGE

## 2022-04-18 NOTE — ASU DISCHARGE PLAN (ADULT/PEDIATRIC) - NS MD DC FALL RISK RISK
For information on Fall & Injury Prevention, visit: [email or web address] OR  [email or web address] OR  https://www.cdc.gov/steadi/patient.html

## 2022-04-18 NOTE — CHART NOTE - NSCHARTNOTEFT_GEN_A_CORE
PACU ANESTHESIA ADMISSION NOTE      Procedure:   Post op diagnosis:      ____  Intubated  TV:______       Rate: ______      FiO2: ______    __x__  Patent Airway    __x__  Full return of protective reflexes    __x__  Full recovery from anesthesia / back to baseline     Vitals:   T:  37         R: 18                 BP: 105/55                 Sat:  97                 P: 75      Mental Status:  __x__ Awake   ___x__ Alert   _____ Drowsy   _____ Sedated    Nausea/Vomiting:  _x___ NO  ______Yes,   See Post - Op Orders          Pain Scale (0-10):  _____    Treatment: __x__ None    ____ See Post - Op/PCA Orders    Post - Operative Fluids:   ____ Oral   ___x_ See Post - Op Orders    Plan: Discharge:   __x__Home       _____Floor     _____Critical Care    _____  Other:_________________    Comments:    Uneventful anesthesia. Patient transported to  spontaneously breathing and hemodynamically stable.

## 2022-04-18 NOTE — ASU PATIENT PROFILE, ADULT - TEACHING/LEARNING DEVELOPMENTAL CONSIDERATIONS
St. Luke's Angel Henriquez,  MD    [phone] (none

## 2022-04-18 NOTE — ASU PATIENT PROFILE, ADULT - FALL HARM RISK - UNIVERSAL INTERVENTIONS
Bed in lowest position, wheels locked, appropriate side rails in place/Call bell, personal items and telephone in reach/Instruct patient to call for assistance before getting out of bed or chair/Non-slip footwear when patient is out of bed/[place] to call system/Physically safe environment - no spills, clutter or unnecessary equipment/Purposeful Proactive Rounding/Room/bathroom lighting operational, light cord in reach

## 2022-04-19 ENCOUNTER — NON-APPOINTMENT (OUTPATIENT)
Age: 47
End: 2022-04-19

## 2022-04-19 LAB
NIGHT BLUE STAIN TISS: SIGNIFICANT CHANGE UP
SPECIMEN SOURCE: SIGNIFICANT CHANGE UP

## 2022-04-20 LAB
CULTURE RESULTS: SIGNIFICANT CHANGE UP
SPECIMEN SOURCE: SIGNIFICANT CHANGE UP

## 2022-04-21 ENCOUNTER — OUTPATIENT (OUTPATIENT)
Dept: OUTPATIENT SERVICES | Facility: HOSPITAL | Age: 47
LOS: 1 days | Discharge: HOME | End: 2022-04-21

## 2022-04-21 ENCOUNTER — OUTPATIENT (OUTPATIENT)
Dept: OUTPATIENT SERVICES | Facility: HOSPITAL | Age: 47
LOS: 1 days | Discharge: HOME | End: 2022-04-21
Payer: COMMERCIAL

## 2022-04-21 ENCOUNTER — RESULT REVIEW (OUTPATIENT)
Age: 47
End: 2022-04-21

## 2022-04-21 VITALS
HEIGHT: 66 IN | HEART RATE: 62 BPM | WEIGHT: 189.6 LBS | TEMPERATURE: 97 F | RESPIRATION RATE: 18 BRPM | SYSTOLIC BLOOD PRESSURE: 145 MMHG | DIASTOLIC BLOOD PRESSURE: 90 MMHG | OXYGEN SATURATION: 99 %

## 2022-04-21 DIAGNOSIS — Z01.818 ENCOUNTER FOR OTHER PREPROCEDURAL EXAMINATION: ICD-10-CM

## 2022-04-21 DIAGNOSIS — C19 MALIGNANT NEOPLASM OF RECTOSIGMOID JUNCTION: ICD-10-CM

## 2022-04-21 DIAGNOSIS — Z98.890 OTHER SPECIFIED POSTPROCEDURAL STATES: Chronic | ICD-10-CM

## 2022-04-21 LAB
A1C WITH ESTIMATED AVERAGE GLUCOSE RESULT: 8.3 % — HIGH (ref 4–5.6)
ALBUMIN SERPL ELPH-MCNC: 4.6 G/DL — SIGNIFICANT CHANGE UP (ref 3.5–5.2)
ALP SERPL-CCNC: 98 U/L — SIGNIFICANT CHANGE UP (ref 30–115)
ALT FLD-CCNC: 40 U/L — SIGNIFICANT CHANGE UP (ref 0–41)
ANION GAP SERPL CALC-SCNC: 14 MMOL/L — SIGNIFICANT CHANGE UP (ref 7–14)
APPEARANCE UR: CLEAR — SIGNIFICANT CHANGE UP
APTT BLD: 32.2 SEC — SIGNIFICANT CHANGE UP (ref 27–39.2)
AST SERPL-CCNC: 28 U/L — SIGNIFICANT CHANGE UP (ref 0–41)
BASOPHILS # BLD AUTO: 0.02 K/UL — SIGNIFICANT CHANGE UP (ref 0–0.2)
BASOPHILS NFR BLD AUTO: 0.3 % — SIGNIFICANT CHANGE UP (ref 0–1)
BILIRUB SERPL-MCNC: 0.3 MG/DL — SIGNIFICANT CHANGE UP (ref 0.2–1.2)
BILIRUB UR-MCNC: NEGATIVE — SIGNIFICANT CHANGE UP
BLD GP AB SCN SERPL QL: SIGNIFICANT CHANGE UP
BUN SERPL-MCNC: 19 MG/DL — SIGNIFICANT CHANGE UP (ref 10–20)
CALCIUM SERPL-MCNC: 9.5 MG/DL — SIGNIFICANT CHANGE UP (ref 8.5–10.1)
CHLORIDE SERPL-SCNC: 96 MMOL/L — LOW (ref 98–110)
CO2 SERPL-SCNC: 28 MMOL/L — SIGNIFICANT CHANGE UP (ref 17–32)
COLOR SPEC: SIGNIFICANT CHANGE UP
CREAT SERPL-MCNC: 1.1 MG/DL — SIGNIFICANT CHANGE UP (ref 0.7–1.5)
DIFF PNL FLD: NEGATIVE — SIGNIFICANT CHANGE UP
EGFR: 83 ML/MIN/1.73M2 — SIGNIFICANT CHANGE UP
EOSINOPHIL # BLD AUTO: 0.32 K/UL — SIGNIFICANT CHANGE UP (ref 0–0.7)
EOSINOPHIL NFR BLD AUTO: 4.9 % — SIGNIFICANT CHANGE UP (ref 0–8)
ESTIMATED AVERAGE GLUCOSE: 192 MG/DL — HIGH (ref 68–114)
GLUCOSE SERPL-MCNC: 170 MG/DL — HIGH (ref 70–99)
GLUCOSE UR QL: ABNORMAL
HCT VFR BLD CALC: 38.6 % — LOW (ref 42–52)
HGB BLD-MCNC: 12.6 G/DL — LOW (ref 14–18)
IMM GRANULOCYTES NFR BLD AUTO: 0.2 % — SIGNIFICANT CHANGE UP (ref 0.1–0.3)
INR BLD: 0.99 RATIO — SIGNIFICANT CHANGE UP (ref 0.65–1.3)
KETONES UR-MCNC: NEGATIVE — SIGNIFICANT CHANGE UP
LEUKOCYTE ESTERASE UR-ACNC: NEGATIVE — SIGNIFICANT CHANGE UP
LYMPHOCYTES # BLD AUTO: 2.5 K/UL — SIGNIFICANT CHANGE UP (ref 1.2–3.4)
LYMPHOCYTES # BLD AUTO: 38.2 % — SIGNIFICANT CHANGE UP (ref 20.5–51.1)
MCHC RBC-ENTMCNC: 24.9 PG — LOW (ref 27–31)
MCHC RBC-ENTMCNC: 32.6 G/DL — SIGNIFICANT CHANGE UP (ref 32–37)
MCV RBC AUTO: 76.3 FL — LOW (ref 80–94)
MONOCYTES # BLD AUTO: 0.56 K/UL — SIGNIFICANT CHANGE UP (ref 0.1–0.6)
MONOCYTES NFR BLD AUTO: 8.5 % — SIGNIFICANT CHANGE UP (ref 1.7–9.3)
MRSA PCR RESULT.: NEGATIVE — SIGNIFICANT CHANGE UP
NEUTROPHILS # BLD AUTO: 3.14 K/UL — SIGNIFICANT CHANGE UP (ref 1.4–6.5)
NEUTROPHILS NFR BLD AUTO: 47.9 % — SIGNIFICANT CHANGE UP (ref 42.2–75.2)
NITRITE UR-MCNC: NEGATIVE — SIGNIFICANT CHANGE UP
NON-GYNECOLOGICAL CYTOLOGY STUDY: SIGNIFICANT CHANGE UP
NRBC # BLD: 0 /100 WBCS — SIGNIFICANT CHANGE UP (ref 0–0)
PH UR: 6.5 — SIGNIFICANT CHANGE UP (ref 5–8)
PLATELET # BLD AUTO: 261 K/UL — SIGNIFICANT CHANGE UP (ref 130–400)
POTASSIUM SERPL-MCNC: 4.6 MMOL/L — SIGNIFICANT CHANGE UP (ref 3.5–5)
POTASSIUM SERPL-SCNC: 4.6 MMOL/L — SIGNIFICANT CHANGE UP (ref 3.5–5)
PROT SERPL-MCNC: 8.3 G/DL — HIGH (ref 6–8)
PROT UR-MCNC: SIGNIFICANT CHANGE UP
PROTHROM AB SERPL-ACNC: 11.4 SEC — SIGNIFICANT CHANGE UP (ref 9.95–12.87)
RBC # BLD: 5.06 M/UL — SIGNIFICANT CHANGE UP (ref 4.7–6.1)
RBC # FLD: 15.8 % — HIGH (ref 11.5–14.5)
SODIUM SERPL-SCNC: 138 MMOL/L — SIGNIFICANT CHANGE UP (ref 135–146)
SP GR SPEC: 1.03 — SIGNIFICANT CHANGE UP (ref 1.01–1.03)
UROBILINOGEN FLD QL: SIGNIFICANT CHANGE UP
WBC # BLD: 6.55 K/UL — SIGNIFICANT CHANGE UP (ref 4.8–10.8)
WBC # FLD AUTO: 6.55 K/UL — SIGNIFICANT CHANGE UP (ref 4.8–10.8)

## 2022-04-21 PROCEDURE — 93010 ELECTROCARDIOGRAM REPORT: CPT

## 2022-04-21 PROCEDURE — 72197 MRI PELVIS W/O & W/DYE: CPT | Mod: 26

## 2022-04-21 NOTE — H&P PST ADULT - NSICDXFAMILYHX_GEN_ALL_CORE_FT
FAMILY HISTORY:  Family history of diabetes mellitus (DM)  Family history of kidney stones, Father  FH: HTN (hypertension)  FH: prostate cancer

## 2022-04-21 NOTE — H&P PST ADULT - HISTORY OF PRESENT ILLNESS
CURRENTLY  DENIES ANY CP, SOB, PALPITATIONS, COUGH OR DYSURIA  EXERCISE TOLERANCE 4 FOS WITHOUT SOB    AS PER PATIENT  this is his/her complete medical history including medications - PRESCRIPTIONS  OVER THE COUNTER MEDS    pt denies any covid s/s, 2/2022 tested positive in the past.  Received covid vaccine  pt advised self quarantine till day of procedure      Anesthesia Alert  yes--Difficult Airway class IV  NO--History of neck surgery or radiation  NO--Limited ROM of neck  NO--History of Malignant hyperthermia  NO--No personal or family history of Pseudocholinesterase deficiency.  NO--Prior Anesthesia Complication  NO--Latex Allergy  NO--Loose teeth  NO--History of Rheumatoid Arthritis  NO--ROBERTO CARLOS  NO--Bleeding risk  NO--Other_____    Patient verbalized understanding of instructions and was given the opportunity to ask questions and have them answered.

## 2022-04-21 NOTE — H&P PST ADULT - REASON FOR ADMISSION
47 Y/O M  SCHEDULED FOR PAST FOR Robot assisted low anterior resection, possible open possible ostomy ON 5/6/22 WITH DR SANTANA UNDER GA. PT REPORTS A SUSPICIOUS FINDING ON COLON DURING COLONOSCOPY. BIOPSY WAS INCONCLUSIVE PER WIFE. HE DOES REPORT SEEING SOME BLOOD IN THE STOOL A FEW MONTHS AGO AND SOME UNINTENTIONAL WEIGHT LOSS OF ABOUT 10 POUNDS.

## 2022-04-21 NOTE — H&P PST ADULT - NSICDXPASTMEDICALHX_GEN_ALL_CORE_FT
PAST MEDICAL HISTORY:  Diabetes mellitus     High cholesterol     Hypertension     Stroke 2-3 YRS AGO NO RESIDUAL

## 2022-04-27 DIAGNOSIS — Z79.82 LONG TERM (CURRENT) USE OF ASPIRIN: ICD-10-CM

## 2022-04-27 DIAGNOSIS — D86.9 SARCOIDOSIS, UNSPECIFIED: ICD-10-CM

## 2022-04-27 DIAGNOSIS — I10 ESSENTIAL (PRIMARY) HYPERTENSION: ICD-10-CM

## 2022-04-27 DIAGNOSIS — E11.9 TYPE 2 DIABETES MELLITUS WITHOUT COMPLICATIONS: ICD-10-CM

## 2022-04-27 LAB — NON-GYNECOLOGICAL CYTOLOGY STUDY: SIGNIFICANT CHANGE UP

## 2022-04-29 ENCOUNTER — NON-APPOINTMENT (OUTPATIENT)
Age: 47
End: 2022-04-29

## 2022-04-29 DIAGNOSIS — C18.9 MALIGNANT NEOPLASM OF COLON, UNSPECIFIED: ICD-10-CM

## 2022-05-03 ENCOUNTER — LABORATORY RESULT (OUTPATIENT)
Age: 47
End: 2022-05-03

## 2022-05-05 NOTE — ASU PATIENT PROFILE, ADULT - FALL HARM RISK - UNIVERSAL INTERVENTIONS
Bed in lowest position, wheels locked, appropriate side rails in place/Call bell, personal items and telephone in reach/Instruct patient to call for assistance before getting out of bed or chair/Non-slip footwear when patient is out of bed/[place] to call system/Physically safe environment - no spills, clutter or unnecessary equipment/Purposeful Proactive Rounding/Room/bathroom lighting operational, light cord in reach

## 2022-05-06 ENCOUNTER — INPATIENT (INPATIENT)
Facility: HOSPITAL | Age: 47
LOS: 2 days | Discharge: HOME | End: 2022-05-09
Attending: COLON & RECTAL SURGERY | Admitting: COLON & RECTAL SURGERY
Payer: COMMERCIAL

## 2022-05-06 ENCOUNTER — RESULT REVIEW (OUTPATIENT)
Age: 47
End: 2022-05-06

## 2022-05-06 ENCOUNTER — TRANSCRIPTION ENCOUNTER (OUTPATIENT)
Age: 47
End: 2022-05-06

## 2022-05-06 ENCOUNTER — APPOINTMENT (OUTPATIENT)
Dept: SURGERY | Facility: HOSPITAL | Age: 47
End: 2022-05-06

## 2022-05-06 VITALS
DIASTOLIC BLOOD PRESSURE: 87 MMHG | HEART RATE: 59 BPM | SYSTOLIC BLOOD PRESSURE: 148 MMHG | OXYGEN SATURATION: 99 % | TEMPERATURE: 99 F | RESPIRATION RATE: 19 BRPM | WEIGHT: 190.04 LBS | HEIGHT: 66 IN

## 2022-05-06 DIAGNOSIS — Z98.890 OTHER SPECIFIED POSTPROCEDURAL STATES: Chronic | ICD-10-CM

## 2022-05-06 DIAGNOSIS — E83.39 OTHER DISORDERS OF PHOSPHORUS METABOLISM: ICD-10-CM

## 2022-05-06 LAB
ANION GAP SERPL CALC-SCNC: 17 MMOL/L — HIGH (ref 7–14)
BASOPHILS # BLD AUTO: 0.01 K/UL — SIGNIFICANT CHANGE UP (ref 0–0.2)
BASOPHILS NFR BLD AUTO: 0.1 % — SIGNIFICANT CHANGE UP (ref 0–1)
BUN SERPL-MCNC: 25 MG/DL — HIGH (ref 10–20)
CALCIUM SERPL-MCNC: 9 MG/DL — SIGNIFICANT CHANGE UP (ref 8.5–10.1)
CHLORIDE SERPL-SCNC: 102 MMOL/L — SIGNIFICANT CHANGE UP (ref 98–110)
CO2 SERPL-SCNC: 21 MMOL/L — SIGNIFICANT CHANGE UP (ref 17–32)
CREAT SERPL-MCNC: 1.4 MG/DL — SIGNIFICANT CHANGE UP (ref 0.7–1.5)
EGFR: 62 ML/MIN/1.73M2 — SIGNIFICANT CHANGE UP
EOSINOPHIL # BLD AUTO: 0 K/UL — SIGNIFICANT CHANGE UP (ref 0–0.7)
EOSINOPHIL NFR BLD AUTO: 0 % — SIGNIFICANT CHANGE UP (ref 0–8)
GLUCOSE BLDC GLUCOMTR-MCNC: 126 MG/DL — HIGH (ref 70–99)
GLUCOSE BLDC GLUCOMTR-MCNC: 144 MG/DL — HIGH (ref 70–99)
GLUCOSE BLDC GLUCOMTR-MCNC: 167 MG/DL — HIGH (ref 70–99)
GLUCOSE SERPL-MCNC: 143 MG/DL — HIGH (ref 70–99)
HCT VFR BLD CALC: 37.3 % — LOW (ref 42–52)
HGB BLD-MCNC: 12.3 G/DL — LOW (ref 14–18)
IMM GRANULOCYTES NFR BLD AUTO: 0.3 % — SIGNIFICANT CHANGE UP (ref 0.1–0.3)
LYMPHOCYTES # BLD AUTO: 1.14 K/UL — LOW (ref 1.2–3.4)
LYMPHOCYTES # BLD AUTO: 10.2 % — LOW (ref 20.5–51.1)
MAGNESIUM SERPL-MCNC: 2.1 MG/DL — SIGNIFICANT CHANGE UP (ref 1.8–2.4)
MCHC RBC-ENTMCNC: 24.9 PG — LOW (ref 27–31)
MCHC RBC-ENTMCNC: 33 G/DL — SIGNIFICANT CHANGE UP (ref 32–37)
MCV RBC AUTO: 75.5 FL — LOW (ref 80–94)
MONOCYTES # BLD AUTO: 0.39 K/UL — SIGNIFICANT CHANGE UP (ref 0.1–0.6)
MONOCYTES NFR BLD AUTO: 3.5 % — SIGNIFICANT CHANGE UP (ref 1.7–9.3)
NEUTROPHILS # BLD AUTO: 9.59 K/UL — HIGH (ref 1.4–6.5)
NEUTROPHILS NFR BLD AUTO: 85.9 % — HIGH (ref 42.2–75.2)
NRBC # BLD: 0 /100 WBCS — SIGNIFICANT CHANGE UP (ref 0–0)
PHOSPHATE SERPL-MCNC: 5.8 MG/DL — HIGH (ref 2.1–4.9)
PLATELET # BLD AUTO: 272 K/UL — SIGNIFICANT CHANGE UP (ref 130–400)
POTASSIUM SERPL-MCNC: 4.4 MMOL/L — SIGNIFICANT CHANGE UP (ref 3.5–5)
POTASSIUM SERPL-SCNC: 4.4 MMOL/L — SIGNIFICANT CHANGE UP (ref 3.5–5)
RBC # BLD: 4.94 M/UL — SIGNIFICANT CHANGE UP (ref 4.7–6.1)
RBC # FLD: 14.7 % — HIGH (ref 11.5–14.5)
SODIUM SERPL-SCNC: 140 MMOL/L — SIGNIFICANT CHANGE UP (ref 135–146)
WBC # BLD: 11.16 K/UL — HIGH (ref 4.8–10.8)
WBC # FLD AUTO: 11.16 K/UL — HIGH (ref 4.8–10.8)

## 2022-05-06 PROCEDURE — 44207 L COLECTOMY/COLOPROCTOSTOMY: CPT

## 2022-05-06 PROCEDURE — 45330 DIAGNOSTIC SIGMOIDOSCOPY: CPT

## 2022-05-06 PROCEDURE — 88307 TISSUE EXAM BY PATHOLOGIST: CPT | Mod: 26

## 2022-05-06 PROCEDURE — 88304 TISSUE EXAM BY PATHOLOGIST: CPT | Mod: 26

## 2022-05-06 PROCEDURE — S2900 ROBOTIC SURGICAL SYSTEM: CPT | Mod: NC

## 2022-05-06 PROCEDURE — 44213 LAP MOBIL SPLENIC FL ADD-ON: CPT

## 2022-05-06 RX ORDER — SODIUM CHLORIDE 9 MG/ML
1000 INJECTION, SOLUTION INTRAVENOUS
Refills: 0 | Status: DISCONTINUED | OUTPATIENT
Start: 2022-05-06 | End: 2022-05-09

## 2022-05-06 RX ORDER — ATORVASTATIN CALCIUM 80 MG/1
1 TABLET, FILM COATED ORAL
Qty: 0 | Refills: 0 | DISCHARGE

## 2022-05-06 RX ORDER — AMLODIPINE BESYLATE 2.5 MG/1
10 TABLET ORAL DAILY
Refills: 0 | Status: DISCONTINUED | OUTPATIENT
Start: 2022-05-06 | End: 2022-05-09

## 2022-05-06 RX ORDER — PANTOPRAZOLE SODIUM 20 MG/1
40 TABLET, DELAYED RELEASE ORAL
Refills: 0 | Status: DISCONTINUED | OUTPATIENT
Start: 2022-05-06 | End: 2022-05-09

## 2022-05-06 RX ORDER — LISINOPRIL 2.5 MG/1
1 TABLET ORAL
Qty: 0 | Refills: 0 | DISCHARGE

## 2022-05-06 RX ORDER — DEXTROSE 50 % IN WATER 50 %
15 SYRINGE (ML) INTRAVENOUS ONCE
Refills: 0 | Status: DISCONTINUED | OUTPATIENT
Start: 2022-05-06 | End: 2022-05-09

## 2022-05-06 RX ORDER — DEXTROSE 50 % IN WATER 50 %
12.5 SYRINGE (ML) INTRAVENOUS ONCE
Refills: 0 | Status: DISCONTINUED | OUTPATIENT
Start: 2022-05-06 | End: 2022-05-09

## 2022-05-06 RX ORDER — NIFEDIPINE 30 MG
1 TABLET, EXTENDED RELEASE 24 HR ORAL
Qty: 0 | Refills: 0 | DISCHARGE

## 2022-05-06 RX ORDER — DEXTROSE 50 % IN WATER 50 %
25 SYRINGE (ML) INTRAVENOUS ONCE
Refills: 0 | Status: DISCONTINUED | OUTPATIENT
Start: 2022-05-06 | End: 2022-05-09

## 2022-05-06 RX ORDER — HYDROMORPHONE HYDROCHLORIDE 2 MG/ML
0.5 INJECTION INTRAMUSCULAR; INTRAVENOUS; SUBCUTANEOUS
Refills: 0 | Status: DISCONTINUED | OUTPATIENT
Start: 2022-05-06 | End: 2022-05-06

## 2022-05-06 RX ORDER — GLYBURIDE 5 MG
2 TABLET ORAL
Qty: 0 | Refills: 0 | DISCHARGE

## 2022-05-06 RX ORDER — GLUCAGON INJECTION, SOLUTION 0.5 MG/.1ML
1 INJECTION, SOLUTION SUBCUTANEOUS ONCE
Refills: 0 | Status: DISCONTINUED | OUTPATIENT
Start: 2022-05-06 | End: 2022-05-09

## 2022-05-06 RX ORDER — INSULIN LISPRO 100/ML
VIAL (ML) SUBCUTANEOUS
Refills: 0 | Status: DISCONTINUED | OUTPATIENT
Start: 2022-05-06 | End: 2022-05-09

## 2022-05-06 RX ORDER — ENOXAPARIN SODIUM 100 MG/ML
40 INJECTION SUBCUTANEOUS EVERY 24 HOURS
Refills: 0 | Status: DISCONTINUED | OUTPATIENT
Start: 2022-05-06 | End: 2022-05-09

## 2022-05-06 RX ORDER — KETOROLAC TROMETHAMINE 30 MG/ML
30 SYRINGE (ML) INJECTION EVERY 8 HOURS
Refills: 0 | Status: DISCONTINUED | OUTPATIENT
Start: 2022-05-06 | End: 2022-05-07

## 2022-05-06 RX ORDER — HEPARIN SODIUM 5000 [USP'U]/ML
5000 INJECTION INTRAVENOUS; SUBCUTANEOUS ONCE
Refills: 0 | Status: DISCONTINUED | OUTPATIENT
Start: 2022-05-06 | End: 2022-05-06

## 2022-05-06 RX ORDER — AMLODIPINE BESYLATE 2.5 MG/1
1 TABLET ORAL
Qty: 0 | Refills: 0 | DISCHARGE

## 2022-05-06 RX ORDER — LISINOPRIL 2.5 MG/1
40 TABLET ORAL DAILY
Refills: 0 | Status: DISCONTINUED | OUTPATIENT
Start: 2022-05-06 | End: 2022-05-07

## 2022-05-06 RX ORDER — BUPIVACAINE 13.3 MG/ML
20 INJECTION, SUSPENSION, LIPOSOMAL INFILTRATION ONCE
Refills: 0 | Status: DISCONTINUED | OUTPATIENT
Start: 2022-05-06 | End: 2022-05-06

## 2022-05-06 RX ORDER — HYDROCHLOROTHIAZIDE 25 MG
25 TABLET ORAL DAILY
Refills: 0 | Status: DISCONTINUED | OUTPATIENT
Start: 2022-05-06 | End: 2022-05-09

## 2022-05-06 RX ORDER — GABAPENTIN 400 MG/1
600 CAPSULE ORAL ONCE
Refills: 0 | Status: DISCONTINUED | OUTPATIENT
Start: 2022-05-06 | End: 2022-05-06

## 2022-05-06 RX ORDER — FERROUS SULFATE 325(65) MG
1 TABLET ORAL
Qty: 0 | Refills: 0 | DISCHARGE

## 2022-05-06 RX ORDER — CEFOTETAN DISODIUM 1 G
2 VIAL (EA) INJECTION EVERY 12 HOURS
Refills: 0 | Status: COMPLETED | OUTPATIENT
Start: 2022-05-06 | End: 2022-05-07

## 2022-05-06 RX ORDER — HYDROMORPHONE HYDROCHLORIDE 2 MG/ML
0.5 INJECTION INTRAMUSCULAR; INTRAVENOUS; SUBCUTANEOUS EVERY 6 HOURS
Refills: 0 | Status: DISCONTINUED | OUTPATIENT
Start: 2022-05-06 | End: 2022-05-09

## 2022-05-06 RX ORDER — SITAGLIPTIN AND METFORMIN HYDROCHLORIDE 500; 50 MG/1; MG/1
2 TABLET, FILM COATED ORAL
Qty: 0 | Refills: 0 | DISCHARGE

## 2022-05-06 RX ORDER — ACETAMINOPHEN 500 MG
1000 TABLET ORAL ONCE
Refills: 0 | Status: DISCONTINUED | OUTPATIENT
Start: 2022-05-06 | End: 2022-05-06

## 2022-05-06 RX ORDER — NIFEDIPINE 30 MG
60 TABLET, EXTENDED RELEASE 24 HR ORAL DAILY
Refills: 0 | Status: DISCONTINUED | OUTPATIENT
Start: 2022-05-06 | End: 2022-05-09

## 2022-05-06 RX ORDER — ONDANSETRON 8 MG/1
4 TABLET, FILM COATED ORAL ONCE
Refills: 0 | Status: DISCONTINUED | OUTPATIENT
Start: 2022-05-06 | End: 2022-05-06

## 2022-05-06 RX ORDER — EMPAGLIFLOZIN 10 MG/1
1 TABLET, FILM COATED ORAL
Qty: 0 | Refills: 0 | DISCHARGE

## 2022-05-06 RX ORDER — SODIUM CHLORIDE 9 MG/ML
1000 INJECTION, SOLUTION INTRAVENOUS
Refills: 0 | Status: DISCONTINUED | OUTPATIENT
Start: 2022-05-06 | End: 2022-05-07

## 2022-05-06 RX ORDER — ACETAMINOPHEN 500 MG
650 TABLET ORAL EVERY 6 HOURS
Refills: 0 | Status: DISCONTINUED | OUTPATIENT
Start: 2022-05-06 | End: 2022-05-09

## 2022-05-06 RX ORDER — NEBIVOLOL HYDROCHLORIDE 5 MG/1
1 TABLET ORAL
Qty: 0 | Refills: 0 | DISCHARGE

## 2022-05-06 RX ORDER — ATORVASTATIN CALCIUM 80 MG/1
40 TABLET, FILM COATED ORAL AT BEDTIME
Refills: 0 | Status: DISCONTINUED | OUTPATIENT
Start: 2022-05-06 | End: 2022-05-09

## 2022-05-06 RX ADMIN — SODIUM CHLORIDE 125 MILLILITER(S): 9 INJECTION, SOLUTION INTRAVENOUS at 13:59

## 2022-05-06 RX ADMIN — ATORVASTATIN CALCIUM 40 MILLIGRAM(S): 80 TABLET, FILM COATED ORAL at 21:51

## 2022-05-06 RX ADMIN — Medication 650 MILLIGRAM(S): at 16:10

## 2022-05-06 RX ADMIN — HYDROMORPHONE HYDROCHLORIDE 0.5 MILLIGRAM(S): 2 INJECTION INTRAMUSCULAR; INTRAVENOUS; SUBCUTANEOUS at 16:04

## 2022-05-06 RX ADMIN — Medication 650 MILLIGRAM(S): at 16:11

## 2022-05-06 RX ADMIN — Medication 30 MILLIGRAM(S): at 15:09

## 2022-05-06 RX ADMIN — HYDROMORPHONE HYDROCHLORIDE 0.5 MILLIGRAM(S): 2 INJECTION INTRAMUSCULAR; INTRAVENOUS; SUBCUTANEOUS at 14:25

## 2022-05-06 RX ADMIN — Medication 60 MILLIGRAM(S): at 22:14

## 2022-05-06 RX ADMIN — Medication 30 MILLIGRAM(S): at 21:56

## 2022-05-06 RX ADMIN — Medication 100 GRAM(S): at 17:29

## 2022-05-06 RX ADMIN — Medication 30 MILLIGRAM(S): at 14:26

## 2022-05-06 RX ADMIN — ENOXAPARIN SODIUM 40 MILLIGRAM(S): 100 INJECTION SUBCUTANEOUS at 16:09

## 2022-05-06 RX ADMIN — Medication 650 MILLIGRAM(S): at 23:41

## 2022-05-06 NOTE — ASU PREOP CHECKLIST - IV STARTED
Patient does not appear to be in any distress/shows no evidence of clinical instability at this time. Provider has reviewed discharge instructions with the patient. The patient verbalized understanding instructions as well as need for follow up with primary care provider.       Discharge papers given to pt, education provided, and any questions answered. Patient discharged by provider. yes

## 2022-05-06 NOTE — PRE-ANESTHESIA EVALUATION ADULT - NSANTHINDVINFOSD_GEN_ALL_CORE
Encounter Date: 6/13/2020    SCRIBE #1 NOTE: I, Ngozi Dickson, am scribing for, and in the presence of,  Teodora Hamlin PA-C. I have scribed the following portions of the note - Other sections scribed: Procedures.       History     Chief Complaint   Patient presents with    Laceration     laceration between 2nd and 3rd toe left foot     Chief complaint:  Laceration    HPI:    57-year-old male with no pertinent past medical history sent for urgent care for evaluation of laceration between the 2nd and 3rd digits of the left foot.  Patient reports that a metal frame accidentally fell and cut him in between his toes.  He reports some tingling to the 2nd digit. X-ray report at  shows no fracture, dislocation or foreign body  Denies any weakness, nausea, vomiting, fever, chills. No attempted tx.            Review of patient's allergies indicates:  No Known Allergies  History reviewed. No pertinent past medical history.  History reviewed. No pertinent surgical history.  History reviewed. No pertinent family history.  Social History     Tobacco Use    Smoking status: Never Smoker    Smokeless tobacco: Never Used   Substance Use Topics    Alcohol use: Not Currently    Drug use: Not on file     Review of Systems   Constitutional: Negative for chills and fever.   HENT: Negative for trouble swallowing.    Eyes: Negative for redness.   Respiratory: Negative for stridor.    Gastrointestinal: Negative for nausea and vomiting.   Musculoskeletal: Negative for back pain and neck pain.   Skin: Positive for wound.   Neurological: Negative for speech difficulty, weakness and numbness.   Psychiatric/Behavioral: Negative for confusion.       Physical Exam     Initial Vitals [06/13/20 1639]   BP Pulse Resp Temp SpO2   (!) 158/74 67 18 98 °F (36.7 °C) 96 %      MAP       --         Physical Exam    Nursing note and vitals reviewed.  Constitutional: He appears well-developed and well-nourished. No distress.   HENT:   Head:  Normocephalic.   Right Ear: External ear normal.   Left Ear: External ear normal.   Eyes: Conjunctivae are normal.   Cardiovascular:   Pulses:       Dorsalis pedis pulses are 2+ on the right side and 2+ on the left side.   Musculoskeletal:      Comments: No bony TTP.    Neurological: He is alert. A sensory deficit (decreased sensation to distal aspect of 2nd digit of L foot) is present.   Skin: Skin is warm and dry.   1.5 cm laceration between 2nd and 3rd digit of L foot. Bleeding controlled.         ED Course   Lac Repair    Date/Time: 6/13/2020 8:17 PM  Performed by: Teodora Hamlin PA-C  Authorized by: Jose Oden MD   Body area: lower extremity (interdigital space between 2nd and 3rd digit of L foot)  Laceration length: 2 cm  Foreign bodies: no foreign bodies  Tendon involvement: none  Nerve involvement: none  Anesthesia: local infiltration    Anesthesia:  Local Anesthetic: lidocaine 1% without epinephrine  Anesthetic total: 8 mL  Preparation: Patient was prepped and draped in the usual sterile fashion.  Irrigation solution: saline  Irrigation method: syringe  Amount of cleaning: standard  Skin closure: 4-0 nylon  Number of sutures: 4  Patient tolerance: Patient tolerated the procedure well with no immediate complications  Comments: Stitches between 2nd and 3rd toes.        Labs Reviewed - No data to display       Imaging Results    None          Medical Decision Making:   ED Management:  [age] year old male presenting for evaluation laceration to the right foot.  Tetanus is up-to-date.  Laceration repaired per procedure note.  Bactroban applied.  Instructed patient keep the area clean dry and covered.  No neurovascular deficits.  No evidence of tendon injury.  Will have him follow up with primary care for wound check and suture removal in 7-10 days and return to the ER for worsening symptoms or as needed.                                 Clinical Impression:       ICD-10-CM ICD-9-CM   1. Laceration  of left foot, initial encounter  S91.312A 892.0             ED Disposition Condition    Discharge Stable        ED Prescriptions     Medication Sig Dispense Start Date End Date Auth. Provider    mupirocin (BACTROBAN) 2 % ointment Apply topically 3 (three) times daily. for 7 days 15 g 6/13/2020 6/20/2020 Teodora Hamlin PA-C        Follow-up Information     Follow up With Specialties Details Why Contact Info    [facility] [place]    [address] [facility] [address]  [phone]      [facility]  Schedule an appointment as soon as possible for a visit   [address] [facility] [address]  [phone]      [facility] Emergency Medicine Go to  As needed, If symptoms worsen [address]  [facility] [address] [phone]                                     Teodora Hamlin PA-C  06/14/20 [id number]     patient

## 2022-05-06 NOTE — CHART NOTE - NSCHARTNOTEFT_GEN_A_CORE
PACU ANESTHESIA ADMISSION NOTE      Procedure:   Post op diagnosis:      ____  Intubated  TV:______       Rate: ______      FiO2: ______    _x___  Patent Airway    __x__  Full return of protective reflexes    _x___  Full recovery from anesthesia / back to baseline     Vitals:   T:98           R: 12                 BP: 112/78                 Sat:99                   P: 66      Mental Status:  ___x_ Awake   __x___ Alert   _____ Drowsy   _____ Sedated    Nausea/Vomiting:  __x__ NO  ______Yes,   See Post - Op Orders          Pain Scale (0-10):  _____    Treatment: __x__ None    ____ See Post - Op/PCA Orders    Post - Operative Fluids:   ____ Oral   _x___ See Post - Op Orders    Plan: Discharge:   ____Home       __x___Floor     _____Critical Care    _____  Other:_________________    Comments:
Pt seen and examined @ bedside without  complaints  Denies chest pain , shortness of breath , dizziness, or vomiting    Vital Signs Last 24 Hrs  T(C): 36.4 (06 May 2022 15:00), Max: 37 (06 May 2022 07:06)  T(F): 97.5 (06 May 2022 15:00), Max: 98.6 (06 May 2022 07:06)  HR: 57 (06 May 2022 16:00) (56 - 70)  BP: 124/87 (06 May 2022 16:00) (122/71 - 148/87)  BP(mean): --  RR: 16 (06 May 2022 16:00) (13 - 24)  SpO2: 97% (06 May 2022 16:00) (97% - 100%)    On examination pt in no acute distress  CV: S1S2  lungs: + air entry bilat  abd: soft  incisions  c/d/i  NT , ND  : + pereira catheter in place   ext: no calf tenderness  neuro alert and oriented    LABS:      A/P 46yMale s/p Robot-assisted laparoscopic low anterior resection of rectum intraop rigid sigmoidoscopy, mobilization of splenic flexure POD#0 for rectal mass  Continue current management   strict I&O  f/u labs   Encourage incentive spirometry use  Continue close observation

## 2022-05-06 NOTE — BRIEF OPERATIVE NOTE - NSICDXBRIEFPROCEDURE_GEN_ALL_CORE_FT
PROCEDURES:  Block, transversus abdominis plane, bilateral 06-May-2022 14:07:49  Padilla Levi  Mobilization, splenic flexure, laparoscopic 06-May-2022 14:08:12  Padilla Levi  Intraoperative rigid sigmoidoscopy 06-May-2022 14:08:31  Padilla Levi  Robot-assisted laparoscopic low anterior resection of rectum 06-May-2022 14:09:17  Padilla Levi

## 2022-05-06 NOTE — PATIENT PROFILE ADULT - FALL HARM RISK - HARM RISK INTERVENTIONS

## 2022-05-07 LAB
ANION GAP SERPL CALC-SCNC: 11 MMOL/L — SIGNIFICANT CHANGE UP (ref 7–14)
ANION GAP SERPL CALC-SCNC: 9 MMOL/L — SIGNIFICANT CHANGE UP (ref 7–14)
BASOPHILS # BLD AUTO: 0.02 K/UL — SIGNIFICANT CHANGE UP (ref 0–0.2)
BASOPHILS NFR BLD AUTO: 0.2 % — SIGNIFICANT CHANGE UP (ref 0–1)
BUN SERPL-MCNC: 18 MG/DL — SIGNIFICANT CHANGE UP (ref 10–20)
BUN SERPL-MCNC: 28 MG/DL — HIGH (ref 10–20)
CALCIUM SERPL-MCNC: 8.7 MG/DL — SIGNIFICANT CHANGE UP (ref 8.5–10.1)
CALCIUM SERPL-MCNC: 9 MG/DL — SIGNIFICANT CHANGE UP (ref 8.5–10.1)
CHLORIDE SERPL-SCNC: 100 MMOL/L — SIGNIFICANT CHANGE UP (ref 98–110)
CHLORIDE SERPL-SCNC: 103 MMOL/L — SIGNIFICANT CHANGE UP (ref 98–110)
CO2 SERPL-SCNC: 24 MMOL/L — SIGNIFICANT CHANGE UP (ref 17–32)
CO2 SERPL-SCNC: 26 MMOL/L — SIGNIFICANT CHANGE UP (ref 17–32)
CREAT SERPL-MCNC: 1.1 MG/DL — SIGNIFICANT CHANGE UP (ref 0.7–1.5)
CREAT SERPL-MCNC: 1.3 MG/DL — SIGNIFICANT CHANGE UP (ref 0.7–1.5)
EGFR: 68 ML/MIN/1.73M2 — SIGNIFICANT CHANGE UP
EGFR: 83 ML/MIN/1.73M2 — SIGNIFICANT CHANGE UP
EOSINOPHIL # BLD AUTO: 0.09 K/UL — SIGNIFICANT CHANGE UP (ref 0–0.7)
EOSINOPHIL NFR BLD AUTO: 1.1 % — SIGNIFICANT CHANGE UP (ref 0–8)
GLUCOSE BLDC GLUCOMTR-MCNC: 134 MG/DL — HIGH (ref 70–99)
GLUCOSE BLDC GLUCOMTR-MCNC: 140 MG/DL — HIGH (ref 70–99)
GLUCOSE BLDC GLUCOMTR-MCNC: 146 MG/DL — HIGH (ref 70–99)
GLUCOSE BLDC GLUCOMTR-MCNC: 99 MG/DL — SIGNIFICANT CHANGE UP (ref 70–99)
GLUCOSE SERPL-MCNC: 121 MG/DL — HIGH (ref 70–99)
GLUCOSE SERPL-MCNC: 131 MG/DL — HIGH (ref 70–99)
HCT VFR BLD CALC: 34.2 % — LOW (ref 42–52)
HGB BLD-MCNC: 11.4 G/DL — LOW (ref 14–18)
IMM GRANULOCYTES NFR BLD AUTO: 0.2 % — SIGNIFICANT CHANGE UP (ref 0.1–0.3)
LYMPHOCYTES # BLD AUTO: 2.79 K/UL — SIGNIFICANT CHANGE UP (ref 1.2–3.4)
LYMPHOCYTES # BLD AUTO: 34.8 % — SIGNIFICANT CHANGE UP (ref 20.5–51.1)
MAGNESIUM SERPL-MCNC: 2.2 MG/DL — SIGNIFICANT CHANGE UP (ref 1.8–2.4)
MCHC RBC-ENTMCNC: 24.9 PG — LOW (ref 27–31)
MCHC RBC-ENTMCNC: 33.3 G/DL — SIGNIFICANT CHANGE UP (ref 32–37)
MCV RBC AUTO: 74.7 FL — LOW (ref 80–94)
MONOCYTES # BLD AUTO: 0.72 K/UL — HIGH (ref 0.1–0.6)
MONOCYTES NFR BLD AUTO: 9 % — SIGNIFICANT CHANGE UP (ref 1.7–9.3)
NEUTROPHILS # BLD AUTO: 4.37 K/UL — SIGNIFICANT CHANGE UP (ref 1.4–6.5)
NEUTROPHILS NFR BLD AUTO: 54.7 % — SIGNIFICANT CHANGE UP (ref 42.2–75.2)
NRBC # BLD: 0 /100 WBCS — SIGNIFICANT CHANGE UP (ref 0–0)
PHOSPHATE SERPL-MCNC: 2.2 MG/DL — SIGNIFICANT CHANGE UP (ref 2.1–4.9)
PLATELET # BLD AUTO: 230 K/UL — SIGNIFICANT CHANGE UP (ref 130–400)
POTASSIUM SERPL-MCNC: 3.8 MMOL/L — SIGNIFICANT CHANGE UP (ref 3.5–5)
POTASSIUM SERPL-MCNC: 4.1 MMOL/L — SIGNIFICANT CHANGE UP (ref 3.5–5)
POTASSIUM SERPL-SCNC: 3.8 MMOL/L — SIGNIFICANT CHANGE UP (ref 3.5–5)
POTASSIUM SERPL-SCNC: 4.1 MMOL/L — SIGNIFICANT CHANGE UP (ref 3.5–5)
RBC # BLD: 4.58 M/UL — LOW (ref 4.7–6.1)
RBC # FLD: 15.3 % — HIGH (ref 11.5–14.5)
SODIUM SERPL-SCNC: 135 MMOL/L — SIGNIFICANT CHANGE UP (ref 135–146)
SODIUM SERPL-SCNC: 138 MMOL/L — SIGNIFICANT CHANGE UP (ref 135–146)
WBC # BLD: 8.01 K/UL — SIGNIFICANT CHANGE UP (ref 4.8–10.8)
WBC # FLD AUTO: 8.01 K/UL — SIGNIFICANT CHANGE UP (ref 4.8–10.8)

## 2022-05-07 PROCEDURE — 99232 SBSQ HOSP IP/OBS MODERATE 35: CPT

## 2022-05-07 RX ORDER — SODIUM CHLORIDE 9 MG/ML
500 INJECTION, SOLUTION INTRAVENOUS ONCE
Refills: 0 | Status: COMPLETED | OUTPATIENT
Start: 2022-05-07 | End: 2022-05-07

## 2022-05-07 RX ORDER — SODIUM CHLORIDE 9 MG/ML
1000 INJECTION, SOLUTION INTRAVENOUS
Refills: 0 | Status: DISCONTINUED | OUTPATIENT
Start: 2022-05-07 | End: 2022-05-08

## 2022-05-07 RX ORDER — POTASSIUM PHOSPHATE, MONOBASIC POTASSIUM PHOSPHATE, DIBASIC 236; 224 MG/ML; MG/ML
15 INJECTION, SOLUTION INTRAVENOUS ONCE
Refills: 0 | Status: COMPLETED | OUTPATIENT
Start: 2022-05-07 | End: 2022-05-08

## 2022-05-07 RX ADMIN — SODIUM CHLORIDE 250 MILLILITER(S): 9 INJECTION, SOLUTION INTRAVENOUS at 10:54

## 2022-05-07 RX ADMIN — Medication 650 MILLIGRAM(S): at 11:28

## 2022-05-07 RX ADMIN — HYDROMORPHONE HYDROCHLORIDE 0.5 MILLIGRAM(S): 2 INJECTION INTRAMUSCULAR; INTRAVENOUS; SUBCUTANEOUS at 21:41

## 2022-05-07 RX ADMIN — HYDROMORPHONE HYDROCHLORIDE 0.5 MILLIGRAM(S): 2 INJECTION INTRAMUSCULAR; INTRAVENOUS; SUBCUTANEOUS at 21:55

## 2022-05-07 RX ADMIN — AMLODIPINE BESYLATE 10 MILLIGRAM(S): 2.5 TABLET ORAL at 05:03

## 2022-05-07 RX ADMIN — Medication 100 GRAM(S): at 05:05

## 2022-05-07 RX ADMIN — HYDROMORPHONE HYDROCHLORIDE 0.5 MILLIGRAM(S): 2 INJECTION INTRAMUSCULAR; INTRAVENOUS; SUBCUTANEOUS at 15:36

## 2022-05-07 RX ADMIN — PANTOPRAZOLE SODIUM 40 MILLIGRAM(S): 20 TABLET, DELAYED RELEASE ORAL at 05:03

## 2022-05-07 RX ADMIN — Medication 650 MILLIGRAM(S): at 17:22

## 2022-05-07 RX ADMIN — Medication 25 MILLIGRAM(S): at 05:03

## 2022-05-07 RX ADMIN — ATORVASTATIN CALCIUM 40 MILLIGRAM(S): 80 TABLET, FILM COATED ORAL at 21:37

## 2022-05-07 RX ADMIN — ENOXAPARIN SODIUM 40 MILLIGRAM(S): 100 INJECTION SUBCUTANEOUS at 17:22

## 2022-05-07 RX ADMIN — Medication 650 MILLIGRAM(S): at 23:22

## 2022-05-07 NOTE — PROGRESS NOTE ADULT - ATTENDING COMMENTS
Patient seen and examined with surgery PA on floor and discussed management plans.  First postop comfortable sitting in chair pain well controlled  Abd soft dressings dry Patel in place. started on clears today labs reviewed mild creat increase good urine out put otherwise  Repeat labs in am. continue diet

## 2022-05-07 NOTE — PHYSICAL THERAPY INITIAL EVALUATION ADULT - GENERAL OBSERVATIONS, REHAB EVAL
Pt encountered OOB sitting in chair in NAD, +IV, +pereira, no c/o pain and agreeable with PT. Pt performed STS transfer with supervision, ambulated 150 ft close supervision without AD, and negotiated 9 steps close supervision step-to pattern( Lt HR down/Rt HR up). Pt tolerated therapy session and left seated in chair as found, BEBETO Rudd aware.

## 2022-05-07 NOTE — PROGRESS NOTE ADULT - ASSESSMENT
79M w/ PMH DM, HTN, hx of CVA 2-3 years ago, POD 1 s/p robotic assisted laparoscopic LAR for rectal mass.    Plan:   -Patel in place, strict I/O   -FU repeat BMP for poss JUANPABLO and asymptomatic hyperphosphatemia   -NPO, IVF   -Cefotetan x2 doses postoperatively   -Multimodal pain control   -Encourage ambulation/OOB, IS   -ERAS protocol

## 2022-05-07 NOTE — PHYSICAL THERAPY INITIAL EVALUATION ADULT - PERTINENT HX OF CURRENT PROBLEM, REHAB EVAL
46yMale s/p Robot-assisted laparoscopic low anterior resection of rectum intraop rigid sigmoidoscopy, mobilization of splenic flexure POD#1 for rectal mass

## 2022-05-08 LAB
ANION GAP SERPL CALC-SCNC: 10 MMOL/L — SIGNIFICANT CHANGE UP (ref 7–14)
BASOPHILS # BLD AUTO: 0.02 K/UL — SIGNIFICANT CHANGE UP (ref 0–0.2)
BASOPHILS NFR BLD AUTO: 0.2 % — SIGNIFICANT CHANGE UP (ref 0–1)
BUN SERPL-MCNC: 16 MG/DL — SIGNIFICANT CHANGE UP (ref 10–20)
CALCIUM SERPL-MCNC: 9.6 MG/DL — SIGNIFICANT CHANGE UP (ref 8.5–10.1)
CHLORIDE SERPL-SCNC: 99 MMOL/L — SIGNIFICANT CHANGE UP (ref 98–110)
CO2 SERPL-SCNC: 27 MMOL/L — SIGNIFICANT CHANGE UP (ref 17–32)
CREAT SERPL-MCNC: 1 MG/DL — SIGNIFICANT CHANGE UP (ref 0.7–1.5)
EGFR: 93 ML/MIN/1.73M2 — SIGNIFICANT CHANGE UP
EOSINOPHIL # BLD AUTO: 0.17 K/UL — SIGNIFICANT CHANGE UP (ref 0–0.7)
EOSINOPHIL NFR BLD AUTO: 1.9 % — SIGNIFICANT CHANGE UP (ref 0–8)
GLUCOSE BLDC GLUCOMTR-MCNC: 103 MG/DL — HIGH (ref 70–99)
GLUCOSE BLDC GLUCOMTR-MCNC: 134 MG/DL — HIGH (ref 70–99)
GLUCOSE BLDC GLUCOMTR-MCNC: 157 MG/DL — HIGH (ref 70–99)
GLUCOSE BLDC GLUCOMTR-MCNC: 177 MG/DL — HIGH (ref 70–99)
GLUCOSE SERPL-MCNC: 151 MG/DL — HIGH (ref 70–99)
HCT VFR BLD CALC: 38.4 % — LOW (ref 42–52)
HGB BLD-MCNC: 12.6 G/DL — LOW (ref 14–18)
IMM GRANULOCYTES NFR BLD AUTO: 0.3 % — SIGNIFICANT CHANGE UP (ref 0.1–0.3)
LYMPHOCYTES # BLD AUTO: 3.58 K/UL — HIGH (ref 1.2–3.4)
LYMPHOCYTES # BLD AUTO: 40 % — SIGNIFICANT CHANGE UP (ref 20.5–51.1)
MAGNESIUM SERPL-MCNC: 2 MG/DL — SIGNIFICANT CHANGE UP (ref 1.8–2.4)
MCHC RBC-ENTMCNC: 24.6 PG — LOW (ref 27–31)
MCHC RBC-ENTMCNC: 32.8 G/DL — SIGNIFICANT CHANGE UP (ref 32–37)
MCV RBC AUTO: 75 FL — LOW (ref 80–94)
MONOCYTES # BLD AUTO: 0.77 K/UL — HIGH (ref 0.1–0.6)
MONOCYTES NFR BLD AUTO: 8.6 % — SIGNIFICANT CHANGE UP (ref 1.7–9.3)
NEUTROPHILS # BLD AUTO: 4.37 K/UL — SIGNIFICANT CHANGE UP (ref 1.4–6.5)
NEUTROPHILS NFR BLD AUTO: 49 % — SIGNIFICANT CHANGE UP (ref 42.2–75.2)
NRBC # BLD: 0 /100 WBCS — SIGNIFICANT CHANGE UP (ref 0–0)
PHOSPHATE SERPL-MCNC: 3.6 MG/DL — SIGNIFICANT CHANGE UP (ref 2.1–4.9)
PLATELET # BLD AUTO: 284 K/UL — SIGNIFICANT CHANGE UP (ref 130–400)
POTASSIUM SERPL-MCNC: 4.1 MMOL/L — SIGNIFICANT CHANGE UP (ref 3.5–5)
POTASSIUM SERPL-SCNC: 4.1 MMOL/L — SIGNIFICANT CHANGE UP (ref 3.5–5)
RBC # BLD: 5.12 M/UL — SIGNIFICANT CHANGE UP (ref 4.7–6.1)
RBC # FLD: 14.8 % — HIGH (ref 11.5–14.5)
SODIUM SERPL-SCNC: 136 MMOL/L — SIGNIFICANT CHANGE UP (ref 135–146)
WBC # BLD: 8.94 K/UL — SIGNIFICANT CHANGE UP (ref 4.8–10.8)
WBC # FLD AUTO: 8.94 K/UL — SIGNIFICANT CHANGE UP (ref 4.8–10.8)

## 2022-05-08 PROCEDURE — 99232 SBSQ HOSP IP/OBS MODERATE 35: CPT

## 2022-05-08 RX ORDER — GABAPENTIN 400 MG/1
600 CAPSULE ORAL EVERY 8 HOURS
Refills: 0 | Status: DISCONTINUED | OUTPATIENT
Start: 2022-05-08 | End: 2022-05-09

## 2022-05-08 RX ADMIN — GABAPENTIN 600 MILLIGRAM(S): 400 CAPSULE ORAL at 22:25

## 2022-05-08 RX ADMIN — ENOXAPARIN SODIUM 40 MILLIGRAM(S): 100 INJECTION SUBCUTANEOUS at 17:08

## 2022-05-08 RX ADMIN — POTASSIUM PHOSPHATE, MONOBASIC POTASSIUM PHOSPHATE, DIBASIC 62.5 MILLIMOLE(S): 236; 224 INJECTION, SOLUTION INTRAVENOUS at 06:24

## 2022-05-08 RX ADMIN — Medication 650 MILLIGRAM(S): at 06:24

## 2022-05-08 RX ADMIN — Medication 650 MILLIGRAM(S): at 23:54

## 2022-05-08 RX ADMIN — ATORVASTATIN CALCIUM 40 MILLIGRAM(S): 80 TABLET, FILM COATED ORAL at 22:25

## 2022-05-08 RX ADMIN — Medication 60 MILLIGRAM(S): at 06:25

## 2022-05-08 RX ADMIN — Medication 3: at 11:49

## 2022-05-08 RX ADMIN — PANTOPRAZOLE SODIUM 40 MILLIGRAM(S): 20 TABLET, DELAYED RELEASE ORAL at 06:21

## 2022-05-08 RX ADMIN — Medication 25 MILLIGRAM(S): at 06:24

## 2022-05-08 RX ADMIN — Medication 650 MILLIGRAM(S): at 06:54

## 2022-05-08 RX ADMIN — Medication 650 MILLIGRAM(S): at 17:08

## 2022-05-08 RX ADMIN — AMLODIPINE BESYLATE 10 MILLIGRAM(S): 2.5 TABLET ORAL at 06:24

## 2022-05-08 RX ADMIN — Medication 650 MILLIGRAM(S): at 11:50

## 2022-05-08 RX ADMIN — Medication 3: at 17:08

## 2022-05-08 NOTE — PROGRESS NOTE ADULT - ASSESSMENT
79M w/ PMH DM, HTN, hx of CVA 2-3 years ago, POD 1 s/p robotic assisted laparoscopic LAR for rectal mass.    Plan:   -Patel in place, strict I/O   -Cr improving   -CLD, IVF   -Cefotetan x2 doses postoperatively   -Multimodal pain control   -Encourage ambulation/OOB, IS   -ERAS protocol   -PT: home    [id number]

## 2022-05-08 NOTE — PROGRESS NOTE ADULT - ATTENDING COMMENTS
Patient seen and examined with surgery team on rounds and discussed management plans.  Patient comfortable minimal pain today all trocar sites ok Tolerating liquids diet advanced. Dc kelli

## 2022-05-09 ENCOUNTER — TRANSCRIPTION ENCOUNTER (OUTPATIENT)
Age: 47
End: 2022-05-09

## 2022-05-09 VITALS
DIASTOLIC BLOOD PRESSURE: 66 MMHG | OXYGEN SATURATION: 97 % | TEMPERATURE: 98 F | HEART RATE: 94 BPM | RESPIRATION RATE: 18 BRPM | SYSTOLIC BLOOD PRESSURE: 121 MMHG

## 2022-05-09 LAB
GLUCOSE BLDC GLUCOMTR-MCNC: 163 MG/DL — HIGH (ref 70–99)
GLUCOSE BLDC GLUCOMTR-MCNC: 174 MG/DL — HIGH (ref 70–99)
GLUCOSE BLDC GLUCOMTR-MCNC: 200 MG/DL — HIGH (ref 70–99)

## 2022-05-09 RX ORDER — OXYCODONE HYDROCHLORIDE 5 MG/1
1 TABLET ORAL
Qty: 10 | Refills: 0
Start: 2022-05-09

## 2022-05-09 RX ORDER — ENOXAPARIN SODIUM 100 MG/ML
40 INJECTION SUBCUTANEOUS
Qty: 1200 | Refills: 0
Start: 2022-05-09 | End: 2022-06-07

## 2022-05-09 RX ORDER — ENOXAPARIN SODIUM 100 MG/ML
40 INJECTION SUBCUTANEOUS
Qty: 12 | Refills: 0
Start: 2022-05-09 | End: 2022-06-07

## 2022-05-09 RX ADMIN — Medication 60 MILLIGRAM(S): at 05:58

## 2022-05-09 RX ADMIN — Medication 650 MILLIGRAM(S): at 05:58

## 2022-05-09 RX ADMIN — Medication 3: at 08:00

## 2022-05-09 RX ADMIN — PANTOPRAZOLE SODIUM 40 MILLIGRAM(S): 20 TABLET, DELAYED RELEASE ORAL at 05:59

## 2022-05-09 RX ADMIN — Medication 25 MILLIGRAM(S): at 05:58

## 2022-05-09 RX ADMIN — AMLODIPINE BESYLATE 10 MILLIGRAM(S): 2.5 TABLET ORAL at 05:58

## 2022-05-09 NOTE — PROGRESS NOTE ADULT - ASSESSMENT
ASSESSMENT:   80 y/o M with pmhx DM, HTN, HLD, CVA, rectal CA POD#3 s/p robotic low anterior resection with primary anastomosis. Patient is voiding, ambulating, pain controlled, belly soft   tolerating diet     Plan:   possible DC home   attending to see

## 2022-05-09 NOTE — PROGRESS NOTE ADULT - SUBJECTIVE AND OBJECTIVE BOX
COLORECTAL SURGERY PROGRESS NOTE      Events of past 24 hours:  POD 1 s/p RA LAR.  Patient seen bedside and in NAD, resting comfortably in bed, conversational, denies pain.  Patel in place with 1.1L joni colored UOP, -BM, -flatus, -OOB/Amb, IS bedside and patient pulling 700.    Cr elevated to 1.4 from baseline of 1.1, Toradol and lisinopril held.  Phos 5.8, patient asymptomatic and Ca levels normal, repeat BMP ordered for AM.        ROS otherwise negative except per subjective and HPI      Vital Signs Last 24 Hrs  T(C): 37.2 (07 May 2022 00:43), Max: 37.2 (07 May 2022 00:43)  T(F): 98.9 (07 May 2022 00:43), Max: 98.9 (07 May 2022 00:43)  HR: 73 (07 May 2022 00:43) (56 - 80)  BP: 127/66 (07 May 2022 00:43) (122/71 - 148/87)  BP(mean): --  RR: 18 (07 May 2022 00:43) (13 - 24)  SpO2: 100% (07 May 2022 00:43) (96% - 100%)    Diet, NPO (05-06-22 @ 18:48) [Active]          I&O's Detail    06 May 2022 07:01  -  07 May 2022 02:15  --------------------------------------------------------  IN:    Lactated Ringers: 500 mL    Oral Fluid: 10 mL  Total IN: 510 mL    OUT:    Indwelling Catheter - Urethral (mL): 1135 mL  Total OUT: 1135 mL    Total NET: -625 mL          General Appearance: NAD  Heart: RRR  Lungs: Unlabored breathing at rest  Abdomen:  S/ND/AT. No guarding or rebound.  Incisional dressings c/d/i          MEDICATIONS:   MEDICATIONS  (STANDING):  acetaminophen     Tablet .. 650 milliGRAM(s) Oral every 6 hours  amLODIPine   Tablet 10 milliGRAM(s) Oral daily  atorvastatin 40 milliGRAM(s) Oral at bedtime  cefoTEtan  IVPB 2 Gram(s) IV Intermittent every 12 hours  dextrose 5%. 1000 milliLiter(s) (50 mL/Hr) IV Continuous <Continuous>  dextrose 5%. 1000 milliLiter(s) (100 mL/Hr) IV Continuous <Continuous>  dextrose 50% Injectable 25 Gram(s) IV Push once  dextrose 50% Injectable 12.5 Gram(s) IV Push once  dextrose 50% Injectable 25 Gram(s) IV Push once  enoxaparin Injectable 40 milliGRAM(s) SubCutaneous every 24 hours  glucagon  Injectable 1 milliGRAM(s) IntraMuscular once  hydrochlorothiazide 25 milliGRAM(s) Oral daily  insulin lispro (ADMELOG) corrective regimen sliding scale   SubCutaneous three times a day before meals  lactated ringers. 1000 milliLiter(s) (125 mL/Hr) IV Continuous <Continuous>  NIFEdipine XL 60 milliGRAM(s) Oral daily  pantoprazole    Tablet 40 milliGRAM(s) Oral before breakfast    MEDICATIONS  (PRN):  dextrose Oral Gel 15 Gram(s) Oral once PRN Blood Glucose LESS THAN 70 milliGRAM(s)/deciliter  HYDROmorphone  Injectable 0.5 milliGRAM(s) IV Push every 6 hours PRN Severe Pain (7 - 10)        LAB/STUDIES:                        12.3   11.16 )-----------( 272      ( 06 May 2022 20:00 )             37.3     05-06    140  |  102  |  25<H>  ----------------------------<  143<H>  4.4   |  21  |  1.4    Ca    9.0      06 May 2022 20:00  Phos  5.8     05-06  Mg     2.1     05-06        
GENERAL SURGERY PROGRESS NOTE     GUERO ESTRADA  46y  Male  Hospital day :3d  POD:  Procedure: Block, transversus abdominis plane, bilateral    Mobilization, splenic flexure, laparoscopic    Intraoperative rigid sigmoidoscopy    Laparoscopic low anterior resection    Robot-assisted laparoscopic low anterior resection of rectum      OVERNIGHT EVENTS:    T(F): 98.3 (05-09-22 @ 05:00), Max: 98.8 (05-08-22 @ 21:00)  HR: 81 (05-09-22 @ 05:00) (67 - 81)  BP: 128/81 (05-09-22 @ 05:00) (128/81 - 140/78)  ABP: --  ABP(mean): --  RR: 18 (05-09-22 @ 05:00) (18 - 18)  SpO2: 98% (05-09-22 @ 05:00) (98% - 98%)    DIET/FLUIDS: dextrose 5%. 1000 milliLiter(s) IV Continuous <Continuous>  dextrose 5%. 1000 milliLiter(s) IV Continuous <Continuous>    NG:                                                                                DRAINS:     BM:   05-08-22 @ 07:01  -  05-09-22 @ 07:00  --------------------------------------------------------  OUT: 1 mL      EMESIS:     URINE:   05-08-22 @ 07:01  -  05-09-22 @ 07:00  --------------------------------------------------------  OUT: 2700 mL       GI proph:  pantoprazole    Tablet 40 milliGRAM(s) Oral before breakfast    AC/ proph: enoxaparin Injectable 40 milliGRAM(s) SubCutaneous every 24 hours    ABx:     PHYSICAL EXAM:  GENERAL: NAD, well-appearing  CHEST/LUNG: Clear to auscultation bilaterally  HEART: Regular rate and rhythm  ABDOMEN: Soft, Nontender, Nondistended; incisions healing well  EXTREMITIES:  No clubbing, cyanosis, or edema      LABS  Labs:  CAPILLARY BLOOD GLUCOSE      POCT Blood Glucose.: 174 mg/dL (09 May 2022 07:19)  POCT Blood Glucose.: 134 mg/dL (08 May 2022 21:09)  POCT Blood Glucose.: 177 mg/dL (08 May 2022 16:40)  POCT Blood Glucose.: 157 mg/dL (08 May 2022 11:33)                          12.6   8.94  )-----------( 284      ( 08 May 2022 21:03 )             38.4       Auto Neutrophil %: 49.0 % (05-08-22 @ 21:03)  Auto Immature Granulocyte %: 0.3 % (05-08-22 @ 21:03)    05-08    136  |  99  |  16  ----------------------------<  151<H>  4.1   |  27  |  1.0      Calcium, Total Serum: 9.6 mg/dL (05-08-22 @ 21:03)      LFTs:         Coags:      RADIOLOGY & ADDITIONAL TESTS:              
GENERAL SURGERY PROGRESS NOTE    Patient: GUERO ESTRADA , 46y ([birth year])Male   MRN: [id number]  Location: [address]  Visit: 05-06-22 Inpatient  Date: 05-08-22 @ 03:27    Hospital Day #: 3  Post-Op Day #: 2    Procedure/Dx/Injuries: s/p ex robot assisted LAR with primary anastomosis, neg leak test    Events of past 24 hours: Patel in place, pain minimal, having gas no BM, ambulating, tolerating diet.    PAST MEDICAL & SURGICAL HISTORY:  Hypertension    Diabetes mellitus    High cholesterol    Stroke  2-3 YRS AGO NO RESIDUAL    H/O colonoscopy        Vitals:   T(F): 98 (05-08-22 @ 00:43), Max: 98.4 (05-07-22 @ 05:00)  HR: 60 (05-08-22 @ 00:43)  BP: 135/65 (05-08-22 @ 00:43)  RR: 18 (05-08-22 @ 00:43)  SpO2: 98% (05-08-22 @ 00:43)      Diet, Clear Liquid:   Consistent Carbohydrate No Snacks  DASH/TLC Sodium & Cholesterol Restricted      Fluids: lactated ringers.: Solution, 1000 milliLiter(s) infuse at 40 mL/Hr  Provider's Contact #: [phone]      I & O's:    05-06-22 @ 07:01  -  05-07-22 @ 07:00  --------------------------------------------------------  IN:    Lactated Ringers: 2125 mL    Oral Fluid: 10 mL  Total IN: 2135 mL    OUT:    Indwelling Catheter - Urethral (mL): 1735 mL  Total OUT: 1735 mL    Total NET: 400 mL        Bowel Movement: : [] YES [] NO  Flatus: : [] YES [] NO    PHYSICAL EXAM:  General: NAD, AAOx3, calm and cooperative  HEENT: NCAT, TAYLA, EOMI, Trachea ML, Neck supple  Cardiac: RRR S1, S2, no Murmurs, rubs or gallops  Respiratory: CTAB, normal respiratory effort, breath sounds equal BL, no wheeze, rhonchi or crackles  Abdomen: Soft, non-distended, non-tender, dressing clean dry intact    MEDICATIONS  (STANDING):  acetaminophen     Tablet .. 650 milliGRAM(s) Oral every 6 hours  amLODIPine   Tablet 10 milliGRAM(s) Oral daily  atorvastatin 40 milliGRAM(s) Oral at bedtime  dextrose 5%. 1000 milliLiter(s) (100 mL/Hr) IV Continuous <Continuous>  dextrose 5%. 1000 milliLiter(s) (50 mL/Hr) IV Continuous <Continuous>  dextrose 50% Injectable 25 Gram(s) IV Push once  dextrose 50% Injectable 12.5 Gram(s) IV Push once  dextrose 50% Injectable 25 Gram(s) IV Push once  enoxaparin Injectable 40 milliGRAM(s) SubCutaneous every 24 hours  glucagon  Injectable 1 milliGRAM(s) IntraMuscular once  hydrochlorothiazide 25 milliGRAM(s) Oral daily  insulin lispro (ADMELOG) corrective regimen sliding scale   SubCutaneous three times a day before meals  lactated ringers. 1000 milliLiter(s) (40 mL/Hr) IV Continuous <Continuous>  NIFEdipine XL 60 milliGRAM(s) Oral daily  pantoprazole    Tablet 40 milliGRAM(s) Oral before breakfast  potassium phosphate IVPB 15 milliMole(s) IV Intermittent once    MEDICATIONS  (PRN):  dextrose Oral Gel 15 Gram(s) Oral once PRN Blood Glucose LESS THAN 70 milliGRAM(s)/deciliter  HYDROmorphone  Injectable 0.5 milliGRAM(s) IV Push every 6 hours PRN Severe Pain (7 - 10)      DVT PROPHYLAXIS: enoxaparin Injectable 40 milliGRAM(s) SubCutaneous every 24 hours    GI PROPHYLAXIS: pantoprazole    Tablet 40 milliGRAM(s) Oral before breakfast    ANTICOAGULATION:   ANTIBIOTICS:            LAB/STUDIES:  Labs:  CAPILLARY BLOOD GLUCOSE      POCT Blood Glucose.: 134 mg/dL (07 May 2022 21:23)  POCT Blood Glucose.: 99 mg/dL (07 May 2022 16:38)  POCT Blood Glucose.: 140 mg/dL (07 May 2022 11:34)  POCT Blood Glucose.: 146 mg/dL (07 May 2022 07:26)                          11.4   8.01  )-----------( 230      ( 07 May 2022 22:11 )             34.2       Auto Neutrophil %: 54.7 % (05-07-22 @ 22:11)  Auto Immature Granulocyte %: 0.2 % (05-07-22 @ 22:11)    05-07    135  |  100  |  18  ----------------------------<  131<H>  3.8   |  26  |  1.1      Calcium, Total Serum: 9.0 mg/dL (05-07-22 @ 22:11)      LFTs:         Coags:

## 2022-05-09 NOTE — DISCHARGE NOTE NURSING/CASE MANAGEMENT/SOCIAL WORK - NSDCPEFALRISK_GEN_ALL_CORE
For information on Fall & Injury Prevention, visit: [email or web address] OR  [email or web address] OR  https://www.cdc.gov/steadi/patient.html

## 2022-05-09 NOTE — DISCHARGE NOTE PROVIDER - HOSPITAL COURSE
47 y/o M with pmhx of DM, HTN, HLD, hx of CVA presented electively for robotic low anterior resection with primary anastomosis for rectal mass. Post op patient had diet slowly advanced, tolerating well. Pain is controlled. He is having gas and BMs. Patient voiding and ambulating without difficulty.     Patient will be discharged home on pain medications and 30 days of LVX for DVT ppx.   Patient should follow up in office in 1-2 weeks with Dr. Dill.

## 2022-05-09 NOTE — DISCHARGE NOTE NURSING/CASE MANAGEMENT/SOCIAL WORK - PATIENT PORTAL LINK FT
You can access the FollowMyHealth Patient Portal offered by [facility] by registering at the following website: [email or web address]. By joining [employer]’s FollowMyHealth portal, you will also be able to view your health information using other applications (apps) compatible with our system.

## 2022-05-09 NOTE — PROGRESS NOTE ADULT - ATTENDING COMMENTS
46M with PMH of DM, HTN, HLD, CVA, POD 3 s/p robotic low anterior resection for proximal rectal mass.     Patient seen and examined.  No acute events over night.  Patient is tolerating a diet, voiding, passing flatus and loose BM.  Denies nausea or vomiting    Abdomen - soft, non distended, appropriately tender.  Wounds with dermabond in place. No erythema induration or purulence    Vitals and labs reviewed    Plan  - Low fiber diet  - DC IVF  - tylenol, oxycodone for pain  - DC home today

## 2022-05-09 NOTE — DISCHARGE NOTE PROVIDER - NSDCFUSCHEDAPPT_GEN_ALL_CORE_FT
Lucian Dill  [facility]  [place] [address]  Scheduled Appointment: 05/18/2022    Ross Orellana  [facility]  [place] [address]  Scheduled Appointment: 05/20/2022

## 2022-05-09 NOTE — DISCHARGE NOTE PROVIDER - NSDCCPCAREPLAN_GEN_ALL_CORE_FT
PRINCIPAL DISCHARGE DIAGNOSIS  Diagnosis: History of low anterior resection of rectum  Assessment and Plan of Treatment: You had a rectal mass and presented for elective low anterior resection of rectum.   Procedure was able to be performed robotically.   You will be discharged home on all of your previous home medications in addition to Lovenox for 30 days to prevent pelvic blood clots.   This medication was sent to [facility] in \[facility]".   If you are in pain- you may take Tylenol and Motrin as needed.   Avoid heavy lifting >15 lbs for the next 6 weeks.   You may shower regularly.   Please follow up in office with Dr. Dill in 1-2 weeks.   If you experience severe abdominal pain, chest pain, nausea, vomiting, heavy bleeding per rectum, fever- call office or go ot nearest Emergency Department.

## 2022-05-09 NOTE — DISCHARGE NOTE PROVIDER - NSDCMRMEDTOKEN_GEN_ALL_CORE_FT
amLODIPine 10 mg oral tablet: 1 tab(s) orally once a day  aspirin 81 mg oral tablet, chewable: 1 tab(s) orally once a day  atorvastatin 40 mg oral tablet: 1 tab(s) orally once a day  Bystolic 10 mg oral tablet: 1 tab(s) orally once a day  enoxaparin: 40 milligram(s) subcutaneous once a day   enoxaparin: 40 milligram(s) subcutaneous once a day   enoxaparin 40 mg/0.4 mL injectable solution: 40 milligram(s) subcutaneously once a day   ferrous sulfate 324 mg (65 mg elemental iron) oral delayed release tablet: 1 tab(s) orally once a day  glyBURIDE: 2 milligram(s) orally once a day  hydroCHLOROthiazide 25 mg oral tablet: 1 tab(s) orally once a day  Janumet XR 50 mg-1000 mg oral tablet, extended release: 2 tab(s) orally once a day (in the evening)  Jardiance 25 mg oral tablet: 1 tab(s) orally once a day (in the morning)  lisinopril 40 mg oral tablet: 1 tab(s) orally once a day  NIFEdipine 60 mg oral tablet, extended release: 1 tab(s) orally once a day

## 2022-05-10 PROBLEM — I63.9 CEREBRAL INFARCTION, UNSPECIFIED: Chronic | Status: ACTIVE | Noted: 2022-04-21

## 2022-05-10 PROBLEM — E78.00 PURE HYPERCHOLESTEROLEMIA, UNSPECIFIED: Chronic | Status: ACTIVE | Noted: 2022-04-21

## 2022-05-14 DIAGNOSIS — I10 ESSENTIAL (PRIMARY) HYPERTENSION: ICD-10-CM

## 2022-05-14 DIAGNOSIS — C19 MALIGNANT NEOPLASM OF RECTOSIGMOID JUNCTION: ICD-10-CM

## 2022-05-14 DIAGNOSIS — C20 MALIGNANT NEOPLASM OF RECTUM: ICD-10-CM

## 2022-05-14 DIAGNOSIS — E11.9 TYPE 2 DIABETES MELLITUS WITHOUT COMPLICATIONS: ICD-10-CM

## 2022-05-14 DIAGNOSIS — E78.5 HYPERLIPIDEMIA, UNSPECIFIED: ICD-10-CM

## 2022-05-18 ENCOUNTER — APPOINTMENT (OUTPATIENT)
Dept: SURGERY | Facility: CLINIC | Age: 47
End: 2022-05-18
Payer: COMMERCIAL

## 2022-05-18 VITALS
WEIGHT: 183 LBS | HEART RATE: 69 BPM | TEMPERATURE: 97.3 F | DIASTOLIC BLOOD PRESSURE: 95 MMHG | HEIGHT: 66 IN | OXYGEN SATURATION: 99 % | BODY MASS INDEX: 29.41 KG/M2 | SYSTOLIC BLOOD PRESSURE: 140 MMHG

## 2022-05-18 LAB
CULTURE RESULTS: SIGNIFICANT CHANGE UP
SPECIMEN SOURCE: SIGNIFICANT CHANGE UP
SURGICAL PATHOLOGY STUDY: SIGNIFICANT CHANGE UP

## 2022-05-18 PROCEDURE — 99024 POSTOP FOLLOW-UP VISIT: CPT

## 2022-05-20 ENCOUNTER — APPOINTMENT (OUTPATIENT)
Age: 47
End: 2022-05-20
Payer: COMMERCIAL

## 2022-05-20 ENCOUNTER — TRANSCRIPTION ENCOUNTER (OUTPATIENT)
Age: 47
End: 2022-05-20

## 2022-05-20 VITALS
BODY MASS INDEX: 29.41 KG/M2 | HEART RATE: 67 BPM | OXYGEN SATURATION: 99 % | DIASTOLIC BLOOD PRESSURE: 80 MMHG | SYSTOLIC BLOOD PRESSURE: 140 MMHG | RESPIRATION RATE: 12 BRPM | WEIGHT: 183 LBS | HEIGHT: 66 IN

## 2022-05-20 DIAGNOSIS — R59.0 LOCALIZED ENLARGED LYMPH NODES: ICD-10-CM

## 2022-05-20 PROCEDURE — 99213 OFFICE O/P EST LOW 20 MIN: CPT

## 2022-05-20 NOTE — ASSESSMENT
[FreeTextEntry1] : Mediastinal LN SP EBUS.  NO malignancy \par SP colon tubulovillous adenoma resection

## 2022-05-24 ENCOUNTER — LABORATORY RESULT (OUTPATIENT)
Age: 47
End: 2022-05-24

## 2022-05-26 NOTE — HISTORY OF PRESENT ILLNESS
[FreeTextEntry1] : Patient is a 46M with PMH of HTN, HLD, DM, TIA 3yrs ago presents s/p robotic LAR for proximal rectal mass on 5/6.  Pathology showed TVA with HGD.  Negative for malignancy.  He reports irregular bowel movements.  He is tolerating a diet.  Patient denies fevers, chills, nausea, vomiting, abdominal pain, constipation, diarrhea or unexpected weight loss.  Patient denies a family history of colon cancer rectal cancer or inflammatory bowel disease.  Patient underwent colonoscopy with Dr. Manuel in 2/2022 that showed a rectosigmoid mass.  Path showed TVA with HGD.

## 2022-05-26 NOTE — PHYSICAL EXAM
[Abdomen Masses] : No abdominal masses [Abdomen Tenderness] : ~T No ~M abdominal tenderness [No HSM] : no hepatosplenomegaly [Normal rectal exam] : exam was normal [Excoriation] : no perianal excoriation [Fistula] : no fistulas [Wart] : no warts [Ulcer ___ cm] : no ulcers [Pilonidal Cyst] : no pilonidal cysts [Tender, Swollen] : nontender, non-swollen [Thrombosed] : that was not thrombosed [Skin Tags] : there were no residual hemorrhoidal skin tags seen [Normal] : was normal [None] : there was no rectal mass  [Respiratory Effort] : normal respiratory effort [Normal Rate and Rhythm] : normal rate and rhythm [de-identified] : soft, non tender, non distended.  Well healing port and extraction sites.  [de-identified] : awake, alert and in no acute distress

## 2022-05-26 NOTE — CONSULT LETTER
[Dear  ___] : Dear  [unfilled], [Courtesy Letter:] : I had the pleasure of seeing your patient, [unfilled], in my office today. [Please see my note below.] : Please see my note below. [Consult Closing:] : Thank you very much for allowing me to participate in the care of this patient.  If you have any questions, please do not hesitate to contact me. [FreeTextEntry3] : Sincerely,\par \par Lucian Dill MD, Colon and Rectal Surgery\par Director of Colon and Rectal Surgery\par \par [employer] [facility]\par [address] [place] [address]\par [facility], 3rd Floor\par [place], [place] [address]\par Tel [phone] ext [phone]\par Fax [phone]\par

## 2022-05-26 NOTE — ASSESSMENT
[FreeTextEntry1] : 46M s/p robotic LAR for TVA with HGD\par \par Patient is recovering well.  He will return to a regular diet and light physical activity.  We will see him back in 3 months.

## 2022-05-27 ENCOUNTER — OUTPATIENT (OUTPATIENT)
Dept: OUTPATIENT SERVICES | Facility: HOSPITAL | Age: 47
LOS: 1 days | Discharge: HOME | End: 2022-05-27

## 2022-05-27 DIAGNOSIS — R06.02 SHORTNESS OF BREATH: ICD-10-CM

## 2022-05-27 DIAGNOSIS — Z98.890 OTHER SPECIFIED POSTPROCEDURAL STATES: Chronic | ICD-10-CM

## 2022-05-27 PROCEDURE — 94060 EVALUATION OF WHEEZING: CPT | Mod: 26

## 2022-05-27 PROCEDURE — 94729 DIFFUSING CAPACITY: CPT | Mod: 26

## 2022-05-27 PROCEDURE — 94727 GAS DIL/WSHOT DETER LNG VOL: CPT | Mod: 26

## 2022-06-08 LAB
CULTURE RESULTS: SIGNIFICANT CHANGE UP
SPECIMEN SOURCE: SIGNIFICANT CHANGE UP

## 2022-08-09 ENCOUNTER — APPOINTMENT (OUTPATIENT)
Dept: SURGERY | Facility: CLINIC | Age: 47
End: 2022-08-09

## 2022-08-09 VITALS
HEART RATE: 62 BPM | OXYGEN SATURATION: 99 % | SYSTOLIC BLOOD PRESSURE: 180 MMHG | TEMPERATURE: 97.2 F | BODY MASS INDEX: 30.53 KG/M2 | HEIGHT: 66 IN | WEIGHT: 190 LBS | DIASTOLIC BLOOD PRESSURE: 110 MMHG

## 2022-08-09 DIAGNOSIS — D12.8 BENIGN NEOPLASM OF RECTUM: ICD-10-CM

## 2022-08-09 PROCEDURE — 99213 OFFICE O/P EST LOW 20 MIN: CPT

## 2022-08-09 RX ORDER — METRONIDAZOLE 500 MG/1
500 TABLET ORAL
Qty: 6 | Refills: 0 | Status: DISCONTINUED | COMMUNITY
Start: 2022-04-22 | End: 2022-08-09

## 2022-08-09 RX ORDER — NEOMYCIN SULFATE 500 MG/1
500 TABLET ORAL
Qty: 6 | Refills: 0 | Status: DISCONTINUED | COMMUNITY
Start: 2022-04-22 | End: 2022-08-09

## 2022-08-09 RX ORDER — ENOXAPARIN SODIUM 40 MG/.4ML
40 INJECTION, SOLUTION SUBCUTANEOUS DAILY
Qty: 28 | Refills: 0 | Status: DISCONTINUED | COMMUNITY
Start: 2022-04-29 | End: 2022-08-09

## 2022-08-09 NOTE — PHYSICAL EXAM
[Abdomen Masses] : No abdominal masses [Abdomen Tenderness] : ~T No ~M abdominal tenderness [No HSM] : no hepatosplenomegaly [Normal rectal exam] : exam was normal [Excoriation] : no perianal excoriation [Fistula] : no fistulas [Wart] : no warts [Ulcer ___ cm] : no ulcers [Pilonidal Cyst] : no pilonidal cysts [Tender, Swollen] : nontender, non-swollen [Thrombosed] : that was not thrombosed [Skin Tags] : there were no residual hemorrhoidal skin tags seen [Normal] : was normal [None] : there was no rectal mass  [Respiratory Effort] : normal respiratory effort [Normal Rate and Rhythm] : normal rate and rhythm [de-identified] : soft, non tender, non distended.  Well healed port and extraction sites.  [de-identified] : awake, alert and in no acute distress

## 2022-08-09 NOTE — HISTORY OF PRESENT ILLNESS
[FreeTextEntry1] : Patient is a 46M with PMH of HTN, HLD, DM, TIA 3yrs ago presents s/p robotic LAR for proximal rectal mass on 5/6/22.  Pathology showed TVA with HGD.  Negative for malignancy. He reports today for follow up.  He states he feels well and his bowel movements have improved.  He reports some urgency. He is tolerating a diet.  Patient denies fevers, chills, nausea, vomiting, abdominal pain, constipation, diarrhea or unexpected weight loss.  Patient denies a family history of colon cancer rectal cancer or inflammatory bowel disease.  Patient underwent colonoscopy with Dr. Manuel in 2/2022 that showed a rectosigmoid mass.  Path showed TVA with HGD.

## 2022-08-09 NOTE — CONSULT LETTER
[Dear  ___] : Dear  [unfilled], [Courtesy Letter:] : I had the pleasure of seeing your patient, [unfilled], in my office today. [Please see my note below.] : Please see my note below. [Consult Closing:] : Thank you very much for allowing me to participate in the care of this patient.  If you have any questions, please do not hesitate to contact me. [FreeTextEntry3] : Sincerely,\par \par Lucian Dill MD, Colon and Rectal Surgery\par Director of Colon and Rectal Surgery\par \par [employer] at [facility]\par [address] [place] [address]\par [facility], 3rd Floor\par [place], [place] [address]\par Tel [phone] ext [phone]\par Fax [phone]\par

## 2022-08-09 NOTE — ASSESSMENT
[FreeTextEntry1] : 46M s/p robotic LAR for TVA with HGD\par \par Patient has recovered well.  He will follow up with Dr. Manuel for future colonoscopies.  We will see him back as needed.

## 2022-09-09 ENCOUNTER — APPOINTMENT (OUTPATIENT)
Age: 47
End: 2022-09-09

## 2022-09-14 ENCOUNTER — OUTPATIENT (OUTPATIENT)
Dept: OUTPATIENT SERVICES | Facility: HOSPITAL | Age: 47
LOS: 1 days | Discharge: HOME | End: 2022-09-14

## 2022-09-14 ENCOUNTER — RESULT REVIEW (OUTPATIENT)
Age: 47
End: 2022-09-14

## 2022-09-14 DIAGNOSIS — Z98.890 OTHER SPECIFIED POSTPROCEDURAL STATES: Chronic | ICD-10-CM

## 2022-09-14 DIAGNOSIS — C18.9 MALIGNANT NEOPLASM OF COLON, UNSPECIFIED: ICD-10-CM

## 2022-09-14 LAB — GLUCOSE BLDC GLUCOMTR-MCNC: 179 MG/DL — HIGH (ref 70–99)

## 2022-09-14 PROCEDURE — 78815 PET IMAGE W/CT SKULL-THIGH: CPT | Mod: 26,PI

## 2022-09-28 NOTE — ED ADULT NURSE NOTE - NSFALLRSKUNASSIST_ED_ALL_ED
GENERAL SURGERY  HISTORY AND PHYSICAL       See consult note from 9/27/22 for full history and physical.       -- Stephany Mccabe M.D  General Surgery  Pager: [phone]    
no

## 2023-06-05 ENCOUNTER — OUTPATIENT (OUTPATIENT)
Dept: OUTPATIENT SERVICES | Facility: HOSPITAL | Age: 48
LOS: 1 days | End: 2023-06-05
Payer: COMMERCIAL

## 2023-06-05 DIAGNOSIS — Z98.890 OTHER SPECIFIED POSTPROCEDURAL STATES: Chronic | ICD-10-CM

## 2023-06-05 DIAGNOSIS — K05.6 PERIODONTAL DISEASE, UNSPECIFIED: ICD-10-CM

## 2023-06-05 PROCEDURE — D0220: CPT

## 2023-06-05 PROCEDURE — D0230: CPT

## 2023-06-05 PROCEDURE — D7140: CPT

## 2023-06-06 DIAGNOSIS — K02.63 DENTAL CARIES ON SMOOTH SURFACE PENETRATING INTO PULP: ICD-10-CM

## 2024-04-18 NOTE — OCCUPATIONAL THERAPY INITIAL EVALUATION ADULT - LIGHT TOUCH SENSATION, LUE, REHAB EVAL
Aba for pt's dad to call back. Call back number provided    ----- Message from Erna Alexander sent at 4/18/2024  1:05 PM CDT -----  Regarding: Sahil  Type:  CT/US    Caller is requesting to schedule their annual mammogram appointment.  Order is not listed in EPIC.  Please enter order and contact patient to schedule.    Name of Caller: Sahil moncada    Where would they like the US/CT performed? [facility]    Would the patient rather a call back or a response via [facility]? Call back     Best Call Back Number: [phone]    Additional Information: pt father would like call back from nurse in regards to order because he's not sure which test pt is getting   within normal limits

## 2025-01-24 NOTE — DISCHARGE NOTE PROVIDER - CARE PROVIDER_API CALL
Lucian Dill)  ColonRectal Surgery; Surgery  [address] [facility], [address] [place] [address]  Phone: [phone]  Fax: [phone]  Follow Up Time:    complains of pain/discomfort

## 2025-03-26 DIAGNOSIS — E11.65 TYPE 2 DIABETES MELLITUS WITH HYPERGLYCEMIA: ICD-10-CM
